# Patient Record
Sex: FEMALE | Race: WHITE | NOT HISPANIC OR LATINO | Employment: UNEMPLOYED | URBAN - METROPOLITAN AREA
[De-identification: names, ages, dates, MRNs, and addresses within clinical notes are randomized per-mention and may not be internally consistent; named-entity substitution may affect disease eponyms.]

---

## 2017-01-31 ENCOUNTER — GENERIC CONVERSION - ENCOUNTER (OUTPATIENT)
Dept: OTHER | Facility: OTHER | Age: 22
End: 2017-01-31

## 2017-04-19 ENCOUNTER — GENERIC CONVERSION - ENCOUNTER (OUTPATIENT)
Dept: OTHER | Facility: OTHER | Age: 22
End: 2017-04-19

## 2017-05-23 ENCOUNTER — GENERIC CONVERSION - ENCOUNTER (OUTPATIENT)
Dept: OTHER | Facility: OTHER | Age: 22
End: 2017-05-23

## 2017-06-06 ENCOUNTER — TRANSCRIBE ORDERS (OUTPATIENT)
Dept: LAB | Facility: HOSPITAL | Age: 22
End: 2017-06-06

## 2017-06-06 ENCOUNTER — APPOINTMENT (OUTPATIENT)
Dept: LAB | Facility: HOSPITAL | Age: 22
End: 2017-06-06
Attending: OBSTETRICS & GYNECOLOGY
Payer: COMMERCIAL

## 2017-06-06 DIAGNOSIS — Z34.90 PRENATAL CARE, UNSPECIFIED TRIMESTER: Primary | ICD-10-CM

## 2017-06-06 DIAGNOSIS — Z34.90 PRENATAL CARE, UNSPECIFIED TRIMESTER: ICD-10-CM

## 2017-06-06 LAB
ABO GROUP BLD: NORMAL
BASOPHILS # BLD AUTO: 0.03 THOUSANDS/ΜL (ref 0–0.1)
BASOPHILS NFR BLD AUTO: 0 % (ref 0–1)
BILIRUB UR QL STRIP: NEGATIVE
BLD GP AB SCN SERPL QL: NEGATIVE
CLARITY UR: CLEAR
COLOR UR: YELLOW
EOSINOPHIL # BLD AUTO: 0.2 THOUSAND/ΜL (ref 0–0.61)
EOSINOPHIL NFR BLD AUTO: 3 % (ref 0–6)
ERYTHROCYTE [DISTWIDTH] IN BLOOD BY AUTOMATED COUNT: 12.9 % (ref 11.6–15.1)
GLUCOSE UR STRIP-MCNC: NEGATIVE MG/DL
HCT VFR BLD AUTO: 39.5 % (ref 34.8–46.1)
HGB BLD-MCNC: 13.3 G/DL (ref 11.5–15.4)
HGB UR QL STRIP.AUTO: NEGATIVE
KETONES UR STRIP-MCNC: NEGATIVE MG/DL
LEUKOCYTE ESTERASE UR QL STRIP: NEGATIVE
LYMPHOCYTES # BLD AUTO: 1.9 THOUSANDS/ΜL (ref 0.6–4.47)
LYMPHOCYTES NFR BLD AUTO: 24 % (ref 14–44)
MCH RBC QN AUTO: 29.8 PG (ref 26.8–34.3)
MCHC RBC AUTO-ENTMCNC: 33.7 G/DL (ref 31.4–37.4)
MCV RBC AUTO: 88 FL (ref 82–98)
MONOCYTES # BLD AUTO: 0.44 THOUSAND/ΜL (ref 0.17–1.22)
MONOCYTES NFR BLD AUTO: 6 % (ref 4–12)
NEUTROPHILS # BLD AUTO: 5.33 THOUSANDS/ΜL (ref 1.85–7.62)
NEUTS SEG NFR BLD AUTO: 67 % (ref 43–75)
NITRITE UR QL STRIP: NEGATIVE
NRBC BLD AUTO-RTO: 0 /100 WBCS
PH UR STRIP.AUTO: 6 [PH] (ref 4.5–8)
PLATELET # BLD AUTO: 207 THOUSANDS/UL (ref 149–390)
PMV BLD AUTO: 8.9 FL (ref 8.9–12.7)
PROT UR STRIP-MCNC: NEGATIVE MG/DL
RBC # BLD AUTO: 4.47 MILLION/UL (ref 3.81–5.12)
RH BLD: POSITIVE
RUBV IGG SERPL IA-ACNC: 4.2 IU/ML
SP GR UR STRIP.AUTO: 1.01 (ref 1–1.03)
SPECIMEN EXPIRATION DATE: NORMAL
UROBILINOGEN UR QL STRIP.AUTO: 0.2 E.U./DL
WBC # BLD AUTO: 7.91 THOUSAND/UL (ref 4.31–10.16)

## 2017-06-06 PROCEDURE — 81220 CFTR GENE COM VARIANTS: CPT

## 2017-06-06 PROCEDURE — 80081 OBSTETRIC PANEL INC HIV TSTG: CPT

## 2017-06-06 PROCEDURE — 36415 COLL VENOUS BLD VENIPUNCTURE: CPT

## 2017-06-06 PROCEDURE — 81003 URINALYSIS AUTO W/O SCOPE: CPT

## 2017-06-06 PROCEDURE — 87086 URINE CULTURE/COLONY COUNT: CPT

## 2017-06-07 LAB
BACTERIA UR CULT: NORMAL
HBV SURFACE AG SER QL: NORMAL
HIV 1+2 AB+HIV1 P24 AG SERPL QL IA: NORMAL
RPR SER QL: NORMAL

## 2017-06-13 LAB
CF COMMENT: NORMAL
CFTR MUT ANL BLD/T: NORMAL

## 2017-06-26 DIAGNOSIS — Z34.90 ENCOUNTER FOR SUPERVISION OF NORMAL PREGNANCY: ICD-10-CM

## 2017-08-01 ENCOUNTER — ALLSCRIPTS OFFICE VISIT (OUTPATIENT)
Dept: OTHER | Facility: OTHER | Age: 22
End: 2017-08-01

## 2017-08-01 ENCOUNTER — GENERIC CONVERSION - ENCOUNTER (OUTPATIENT)
Dept: OTHER | Facility: OTHER | Age: 22
End: 2017-08-01

## 2017-08-17 ENCOUNTER — GENERIC CONVERSION - ENCOUNTER (OUTPATIENT)
Dept: OTHER | Facility: OTHER | Age: 22
End: 2017-08-17

## 2017-08-17 ENCOUNTER — ALLSCRIPTS OFFICE VISIT (OUTPATIENT)
Dept: PERINATAL CARE | Facility: CLINIC | Age: 22
End: 2017-08-17
Payer: COMMERCIAL

## 2017-08-17 PROCEDURE — 76805 OB US >/= 14 WKS SNGL FETUS: CPT | Performed by: OBSTETRICS & GYNECOLOGY

## 2017-08-17 PROCEDURE — 76817 TRANSVAGINAL US OBSTETRIC: CPT | Performed by: OBSTETRICS & GYNECOLOGY

## 2017-09-11 ENCOUNTER — ALLSCRIPTS OFFICE VISIT (OUTPATIENT)
Dept: OTHER | Facility: OTHER | Age: 22
End: 2017-09-11

## 2017-09-11 ENCOUNTER — GENERIC CONVERSION - ENCOUNTER (OUTPATIENT)
Dept: OTHER | Facility: OTHER | Age: 22
End: 2017-09-11

## 2017-10-03 ENCOUNTER — LAB CONVERSION - ENCOUNTER (OUTPATIENT)
Dept: OTHER | Facility: OTHER | Age: 22
End: 2017-10-03

## 2017-10-03 LAB
BASOPHILS # BLD AUTO: 0.4 %
BASOPHILS # BLD AUTO: 38 CELLS/UL (ref 0–200)
DEPRECATED RDW RBC AUTO: 12.1 % (ref 11–15)
EOSINOPHIL # BLD AUTO: 0.9 %
EOSINOPHIL # BLD AUTO: 85 CELLS/UL (ref 15–500)
GLUCOSE 1 HR 50 GM GLUC CHALLENGE-PREG PTS (HISTORICAL): 84 MG/DL
HCT VFR BLD AUTO: 33.8 % (ref 35–45)
HEPATITIS C ANTIBODY (HISTORICAL): NORMAL
HGB BLD-MCNC: 11.4 G/DL (ref 11.7–15.5)
LYMPHOCYTES # BLD AUTO: 1288 CELLS/UL (ref 850–3900)
LYMPHOCYTES # BLD AUTO: 13.7 %
MCH RBC QN AUTO: 30.8 PG (ref 27–33)
MCHC RBC AUTO-ENTMCNC: 33.7 G/DL (ref 32–36)
MCV RBC AUTO: 91.4 FL (ref 80–100)
MONOCYTES # BLD AUTO: 414 CELLS/UL (ref 200–950)
MONOCYTES (HISTORICAL): 4.4 %
NEUTROPHILS # BLD AUTO: 7576 CELLS/UL (ref 1500–7800)
NEUTROPHILS # BLD AUTO: 80.6 %
PLATELET # BLD AUTO: 183 THOUSAND/UL (ref 140–400)
PMV BLD AUTO: 9.3 FL (ref 7.5–12.5)
RBC # BLD AUTO: 3.7 MILLION/UL (ref 3.8–5.1)
SIGNAL TO CUT-OFF (HISTORICAL): 0.01
WBC # BLD AUTO: 9.4 THOUSAND/UL (ref 3.8–10.8)

## 2017-10-16 ENCOUNTER — GENERIC CONVERSION - ENCOUNTER (OUTPATIENT)
Dept: OTHER | Facility: OTHER | Age: 22
End: 2017-10-16

## 2017-10-16 ENCOUNTER — ALLSCRIPTS OFFICE VISIT (OUTPATIENT)
Dept: OTHER | Facility: OTHER | Age: 22
End: 2017-10-16

## 2017-10-30 ENCOUNTER — APPOINTMENT (OUTPATIENT)
Dept: PERINATAL CARE | Facility: CLINIC | Age: 22
End: 2017-10-30
Payer: COMMERCIAL

## 2017-10-30 ENCOUNTER — GENERIC CONVERSION - ENCOUNTER (OUTPATIENT)
Dept: OTHER | Facility: OTHER | Age: 22
End: 2017-10-30

## 2017-10-30 PROCEDURE — 76816 OB US FOLLOW-UP PER FETUS: CPT | Performed by: OBSTETRICS & GYNECOLOGY

## 2017-11-01 ENCOUNTER — GENERIC CONVERSION - ENCOUNTER (OUTPATIENT)
Dept: OBGYN CLINIC | Facility: CLINIC | Age: 22
End: 2017-11-01

## 2017-11-20 ENCOUNTER — ALLSCRIPTS OFFICE VISIT (OUTPATIENT)
Dept: OTHER | Facility: OTHER | Age: 22
End: 2017-11-20

## 2017-11-20 LAB
A/G RATIO (HISTORICAL): 1.1 (CALC) (ref 1–2.5)
ALBUMIN SERPL BCP-MCNC: 3.6 G/DL (ref 3.6–5.1)
ALP SERPL-CCNC: 181 U/L (ref 33–115)
ALT SERPL W P-5'-P-CCNC: 18 U/L (ref 6–29)
AST SERPL W P-5'-P-CCNC: 25 U/L (ref 10–30)
BILE ACIDS TOTAL (HISTORICAL): 9.3 UMOL/L
BILIRUB SERPL-MCNC: 0.9 MG/DL (ref 0.2–1.2)
BUN SERPL-MCNC: 7 MG/DL (ref 7–25)
BUN/CREA RATIO (HISTORICAL): ABNORMAL (CALC) (ref 6–22)
CALCIUM (ADJUSTED FOR ALBUMIN) (HISTORICAL): 10.1 MG/DL (CALC) (ref 8.6–10.2)
CALCIUM SERPL-MCNC: 9.5 MG/DL (ref 8.6–10.2)
CHLORIDE SERPL-SCNC: 102 MMOL/L (ref 98–110)
CO2 SERPL-SCNC: 24 MMOL/L (ref 20–31)
CREAT SERPL-MCNC: 0.74 MG/DL (ref 0.5–1.1)
DEPRECATED RDW RBC AUTO: 12.2 % (ref 11–15)
EGFR AFRICAN AMERICAN (HISTORICAL): 133 ML/MIN/1.73M2
EGFR-AMERICAN CALC (HISTORICAL): 115 ML/MIN/1.73M2
GAMMA GLOBULIN (HISTORICAL): 3.2 G/DL (CALC) (ref 1.9–3.7)
GLUCOSE (HISTORICAL): 68 MG/DL (ref 65–99)
HCT VFR BLD AUTO: 39.1 % (ref 35–45)
HGB BLD-MCNC: 13 G/DL (ref 11.7–15.5)
Lab: 9.3 UMOL/L
Lab: <1 UMOL/L
Lab: <1 UMOL/L
MCH RBC QN AUTO: 30.2 PG (ref 27–33)
MCHC RBC AUTO-ENTMCNC: 33.2 G/DL (ref 32–36)
MCV RBC AUTO: 90.7 FL (ref 80–100)
PLATELET # BLD AUTO: 211 THOUSAND/UL (ref 140–400)
PMV BLD AUTO: 10.6 FL (ref 7.5–12.5)
POTASSIUM SERPL-SCNC: 4 MMOL/L (ref 3.5–5.3)
RBC # BLD AUTO: 4.31 MILLION/UL (ref 3.8–5.1)
SODIUM SERPL-SCNC: 138 MMOL/L (ref 135–146)
T4 FREE SERPL-MCNC: 1.2 NG/DL (ref 0.8–1.8)
TOTAL PROTEIN (HISTORICAL): 6.8 G/DL (ref 6.1–8.1)
TSH SERPL DL<=0.05 MIU/L-ACNC: 2.6 MIU/L
WBC # BLD AUTO: 8.8 THOUSAND/UL (ref 3.8–10.8)

## 2017-11-23 LAB — CULTURE GENITAL-BSB ON (HISTORICAL): NEGATIVE

## 2017-11-28 ENCOUNTER — GENERIC CONVERSION - ENCOUNTER (OUTPATIENT)
Dept: OTHER | Facility: OTHER | Age: 22
End: 2017-11-28

## 2017-11-28 DIAGNOSIS — L29.9 PRURITUS: ICD-10-CM

## 2017-11-28 DIAGNOSIS — Z34.03 ENCOUNTER FOR SUPERVISION OF NORMAL FIRST PREGNANCY IN THIRD TRIMESTER: ICD-10-CM

## 2017-11-28 LAB — EXTERNAL GROUP B STREP ANTIGEN: NEGATIVE

## 2017-11-30 ENCOUNTER — LAB CONVERSION - ENCOUNTER (OUTPATIENT)
Dept: OTHER | Facility: OTHER | Age: 22
End: 2017-11-30

## 2017-11-30 LAB
A/G RATIO (HISTORICAL): 1 (CALC) (ref 1–2.5)
ALBUMIN SERPL BCP-MCNC: 3.1 G/DL (ref 3.6–5.1)
ALP SERPL-CCNC: 176 U/L (ref 33–115)
ALT SERPL W P-5'-P-CCNC: 24 U/L (ref 6–29)
AST SERPL W P-5'-P-CCNC: 24 U/L (ref 10–30)
BACTERIA UR QL AUTO: ABNORMAL /HPF
BILIRUB SERPL-MCNC: 0.6 MG/DL (ref 0.2–1.2)
BILIRUB UR QL STRIP: NEGATIVE
BUN SERPL-MCNC: 11 MG/DL (ref 7–25)
BUN/CREA RATIO (HISTORICAL): ABNORMAL (CALC) (ref 6–22)
CALCIUM SERPL-MCNC: 8.3 MG/DL (ref 8.6–10.2)
CHLORIDE SERPL-SCNC: 102 MMOL/L (ref 98–110)
CO2 SERPL-SCNC: 26 MMOL/L (ref 20–31)
COLOR UR: ABNORMAL
COMMENT (HISTORICAL): CLEAR
CREAT SERPL-MCNC: 0.78 MG/DL (ref 0.5–1.1)
CREATININE, RANDOM URINE (HISTORICAL): 151 MG/DL (ref 20–320)
CULTURE RESULT (HISTORICAL): NORMAL
DEPRECATED RDW RBC AUTO: 12.3 % (ref 11–15)
EGFR AFRICAN AMERICAN (HISTORICAL): 124 ML/MIN/1.73M2
EGFR-AMERICAN CALC (HISTORICAL): 107 ML/MIN/1.73M2
FECAL OCCULT BLOOD DIAGNOSTIC (HISTORICAL): NEGATIVE
GAMMA GLOBULIN (HISTORICAL): 3 G/DL (CALC) (ref 1.9–3.7)
GLUCOSE (HISTORICAL): 74 MG/DL (ref 65–99)
GLUCOSE (HISTORICAL): NEGATIVE
HCT VFR BLD AUTO: 37 % (ref 35–45)
HGB BLD-MCNC: 12.3 G/DL (ref 11.7–15.5)
HYALINE CASTS #/AREA URNS LPF: ABNORMAL /LPF
KETONES UR STRIP-MCNC: NEGATIVE MG/DL
LEUKOCYTE ESTERASE UR QL STRIP: NEGATIVE
MCH RBC QN AUTO: 30.4 PG (ref 27–33)
MCHC RBC AUTO-ENTMCNC: 33.2 G/DL (ref 32–36)
MCV RBC AUTO: 91.4 FL (ref 80–100)
NITRITE UR QL STRIP: NEGATIVE
PH UR STRIP.AUTO: 6 [PH] (ref 5–8)
PLATELET # BLD AUTO: 178 THOUSAND/UL (ref 140–400)
PMV BLD AUTO: 11 FL (ref 7.5–12.5)
POTASSIUM SERPL-SCNC: 3.8 MMOL/L (ref 3.5–5.3)
PROT UR STRIP-MCNC: ABNORMAL MG/DL
PROT UR-MCNC: 33 MG/DL (ref 5–24)
PROT/CREAT UR: 219 MG/G CREAT (ref 21–161)
RBC # BLD AUTO: 4.05 MILLION/UL (ref 3.8–5.1)
RBC (HISTORICAL): ABNORMAL /HPF
SODIUM SERPL-SCNC: 135 MMOL/L (ref 135–146)
SP GR UR STRIP.AUTO: 1.02 (ref 1–1.03)
SQUAMOUS EPITHELIAL CELLS (HISTORICAL): ABNORMAL /HPF
TOTAL PROTEIN (HISTORICAL): 6.1 G/DL (ref 6.1–8.1)
URIC ACID (HISTORICAL): 5.3 MG/DL (ref 2.5–7)
WBC # BLD AUTO: 7.5 THOUSAND/UL (ref 3.8–10.8)
WBC # BLD AUTO: ABNORMAL /HPF

## 2017-12-02 ENCOUNTER — LAB CONVERSION - ENCOUNTER (OUTPATIENT)
Dept: OTHER | Facility: OTHER | Age: 22
End: 2017-12-02

## 2017-12-02 LAB
A/G RATIO (HISTORICAL): 1 (CALC) (ref 1–2.5)
ALBUMIN SERPL BCP-MCNC: 3.1 G/DL (ref 3.6–5.1)
ALP SERPL-CCNC: 176 U/L (ref 33–115)
ALT SERPL W P-5'-P-CCNC: 24 U/L (ref 6–29)
AST SERPL W P-5'-P-CCNC: 24 U/L (ref 10–30)
BACTERIA UR QL AUTO: ABNORMAL /HPF
BILE ACIDS TOTAL (HISTORICAL): 48 UMOL/L (ref 0–19)
BILIRUB SERPL-MCNC: 0.6 MG/DL (ref 0.2–1.2)
BILIRUB UR QL STRIP: NEGATIVE
BUN SERPL-MCNC: 11 MG/DL (ref 7–25)
BUN/CREA RATIO (HISTORICAL): ABNORMAL (CALC) (ref 6–22)
CALCIUM SERPL-MCNC: 8.3 MG/DL (ref 8.6–10.2)
CHLORIDE SERPL-SCNC: 102 MMOL/L (ref 98–110)
CO2 SERPL-SCNC: 26 MMOL/L (ref 20–31)
COLOR UR: ABNORMAL
COMMENT (HISTORICAL): CLEAR
CREAT SERPL-MCNC: 0.78 MG/DL (ref 0.5–1.1)
CREATININE, RANDOM URINE (HISTORICAL): 151 MG/DL (ref 20–320)
CULTURE RESULT (HISTORICAL): NORMAL
DEPRECATED RDW RBC AUTO: 12.3 % (ref 11–15)
EGFR AFRICAN AMERICAN (HISTORICAL): 124 ML/MIN/1.73M2
EGFR-AMERICAN CALC (HISTORICAL): 107 ML/MIN/1.73M2
FECAL OCCULT BLOOD DIAGNOSTIC (HISTORICAL): NEGATIVE
GAMMA GLOBULIN (HISTORICAL): 3 G/DL (CALC) (ref 1.9–3.7)
GLUCOSE (HISTORICAL): 74 MG/DL (ref 65–99)
GLUCOSE (HISTORICAL): NEGATIVE
HCT VFR BLD AUTO: 37 % (ref 35–45)
HGB BLD-MCNC: 12.3 G/DL (ref 11.7–15.5)
HYALINE CASTS #/AREA URNS LPF: ABNORMAL /LPF
KETONES UR STRIP-MCNC: NEGATIVE MG/DL
LEUKOCYTE ESTERASE UR QL STRIP: NEGATIVE
MCH RBC QN AUTO: 30.4 PG (ref 27–33)
MCHC RBC AUTO-ENTMCNC: 33.2 G/DL (ref 32–36)
MCV RBC AUTO: 91.4 FL (ref 80–100)
NITRITE UR QL STRIP: NEGATIVE
PH UR STRIP.AUTO: 6 [PH] (ref 5–8)
PLATELET # BLD AUTO: 178 THOUSAND/UL (ref 140–400)
PMV BLD AUTO: 11 FL (ref 7.5–12.5)
POTASSIUM SERPL-SCNC: 3.8 MMOL/L (ref 3.5–5.3)
PROT UR STRIP-MCNC: ABNORMAL MG/DL
PROT UR-MCNC: 33 MG/DL (ref 5–24)
PROT/CREAT UR: 219 MG/G CREAT (ref 21–161)
RBC # BLD AUTO: 4.05 MILLION/UL (ref 3.8–5.1)
RBC (HISTORICAL): ABNORMAL /HPF
SODIUM SERPL-SCNC: 135 MMOL/L (ref 135–146)
SP GR UR STRIP.AUTO: 1.02 (ref 1–1.03)
SQUAMOUS EPITHELIAL CELLS (HISTORICAL): ABNORMAL /HPF
TOTAL PROTEIN (HISTORICAL): 6.1 G/DL (ref 6.1–8.1)
URIC ACID (HISTORICAL): 5.3 MG/DL (ref 2.5–7)
WBC # BLD AUTO: 7.5 THOUSAND/UL (ref 3.8–10.8)
WBC # BLD AUTO: ABNORMAL /HPF

## 2017-12-04 ENCOUNTER — HOSPITAL ENCOUNTER (INPATIENT)
Facility: HOSPITAL | Age: 22
LOS: 4 days | Discharge: HOME | End: 2017-12-08
Attending: OBSTETRICS & GYNECOLOGY | Admitting: OBSTETRICS & GYNECOLOGY
Payer: COMMERCIAL

## 2017-12-04 ENCOUNTER — GENERIC CONVERSION - ENCOUNTER (OUTPATIENT)
Dept: OTHER | Facility: OTHER | Age: 22
End: 2017-12-04

## 2017-12-04 DIAGNOSIS — O45.93 PLACENTAL ABRUPTION IN THIRD TRIMESTER: ICD-10-CM

## 2017-12-04 DIAGNOSIS — Z3A.36 36 WEEKS GESTATION OF PREGNANCY: Primary | ICD-10-CM

## 2017-12-04 LAB
ABO GROUP BLD: NORMAL
ALBUMIN SERPL BCP-MCNC: 2.4 G/DL (ref 3.5–5)
ALP SERPL-CCNC: 215 U/L (ref 46–116)
ALT SERPL W P-5'-P-CCNC: 24 U/L (ref 12–78)
ANION GAP SERPL CALCULATED.3IONS-SCNC: 9 MMOL/L (ref 4–13)
AST SERPL W P-5'-P-CCNC: 34 U/L (ref 5–45)
BACTERIA UR QL AUTO: ABNORMAL /HPF
BILIRUB SERPL-MCNC: 0.76 MG/DL (ref 0.2–1)
BILIRUB UR QL STRIP: ABNORMAL
BLD GP AB SCN SERPL QL: NEGATIVE
BUN SERPL-MCNC: 17 MG/DL (ref 5–25)
CALCIUM SERPL-MCNC: 8.3 MG/DL (ref 8.3–10.1)
CHLORIDE SERPL-SCNC: 102 MMOL/L (ref 100–108)
CLARITY UR: CLEAR
CO2 SERPL-SCNC: 24 MMOL/L (ref 21–32)
COLOR UR: ABNORMAL
CREAT SERPL-MCNC: 0.75 MG/DL (ref 0.6–1.3)
CREAT UR-MCNC: 142 MG/DL
ERYTHROCYTE [DISTWIDTH] IN BLOOD BY AUTOMATED COUNT: 12.9 % (ref 11.6–15.1)
GFR SERPL CREATININE-BSD FRML MDRD: 113 ML/MIN/1.73SQ M
GLUCOSE SERPL-MCNC: 71 MG/DL (ref 65–140)
GLUCOSE UR STRIP-MCNC: NEGATIVE MG/DL
HCT VFR BLD AUTO: 37.1 % (ref 34.8–46.1)
HGB BLD-MCNC: 12.4 G/DL (ref 11.5–15.4)
HGB UR QL STRIP.AUTO: NEGATIVE
HYALINE CASTS #/AREA URNS LPF: ABNORMAL /LPF
KETONES UR STRIP-MCNC: NEGATIVE MG/DL
LEUKOCYTE ESTERASE UR QL STRIP: ABNORMAL
MCH RBC QN AUTO: 30.2 PG (ref 26.8–34.3)
MCHC RBC AUTO-ENTMCNC: 33.4 G/DL (ref 31.4–37.4)
MCV RBC AUTO: 91 FL (ref 82–98)
NITRITE UR QL STRIP: NEGATIVE
NON-SQ EPI CELLS URNS QL MICRO: ABNORMAL /HPF
PH UR STRIP.AUTO: 6 [PH] (ref 4.5–8)
PLATELET # BLD AUTO: 178 THOUSANDS/UL (ref 149–390)
PMV BLD AUTO: 11.2 FL (ref 8.9–12.7)
POTASSIUM SERPL-SCNC: 4.4 MMOL/L (ref 3.5–5.3)
PROT SERPL-MCNC: 7.1 G/DL (ref 6.4–8.2)
PROT UR STRIP-MCNC: ABNORMAL MG/DL
PROT UR-MCNC: 47 MG/DL
PROT/CREAT UR: 0.33 MG/G{CREAT} (ref 0–0.1)
RBC # BLD AUTO: 4.1 MILLION/UL (ref 3.81–5.12)
RBC #/AREA URNS AUTO: ABNORMAL /HPF
RH BLD: POSITIVE
SODIUM SERPL-SCNC: 135 MMOL/L (ref 136–145)
SP GR UR STRIP.AUTO: 1.02 (ref 1–1.03)
SPECIMEN EXPIRATION DATE: NORMAL
UROBILINOGEN UR QL STRIP.AUTO: 1 E.U./DL
WBC # BLD AUTO: 8.15 THOUSAND/UL (ref 4.31–10.16)
WBC #/AREA URNS AUTO: ABNORMAL /HPF

## 2017-12-04 PROCEDURE — 86900 BLOOD TYPING SEROLOGIC ABO: CPT | Performed by: STUDENT IN AN ORGANIZED HEALTH CARE EDUCATION/TRAINING PROGRAM

## 2017-12-04 PROCEDURE — 80053 COMPREHEN METABOLIC PANEL: CPT | Performed by: STUDENT IN AN ORGANIZED HEALTH CARE EDUCATION/TRAINING PROGRAM

## 2017-12-04 PROCEDURE — 81001 URINALYSIS AUTO W/SCOPE: CPT | Performed by: STUDENT IN AN ORGANIZED HEALTH CARE EDUCATION/TRAINING PROGRAM

## 2017-12-04 PROCEDURE — G0463 HOSPITAL OUTPT CLINIC VISIT: HCPCS

## 2017-12-04 PROCEDURE — 86901 BLOOD TYPING SEROLOGIC RH(D): CPT | Performed by: STUDENT IN AN ORGANIZED HEALTH CARE EDUCATION/TRAINING PROGRAM

## 2017-12-04 PROCEDURE — 99214 OFFICE O/P EST MOD 30 MIN: CPT

## 2017-12-04 PROCEDURE — 86592 SYPHILIS TEST NON-TREP QUAL: CPT | Performed by: STUDENT IN AN ORGANIZED HEALTH CARE EDUCATION/TRAINING PROGRAM

## 2017-12-04 PROCEDURE — 85027 COMPLETE CBC AUTOMATED: CPT | Performed by: STUDENT IN AN ORGANIZED HEALTH CARE EDUCATION/TRAINING PROGRAM

## 2017-12-04 PROCEDURE — 86850 RBC ANTIBODY SCREEN: CPT | Performed by: STUDENT IN AN ORGANIZED HEALTH CARE EDUCATION/TRAINING PROGRAM

## 2017-12-04 PROCEDURE — 82570 ASSAY OF URINE CREATININE: CPT | Performed by: STUDENT IN AN ORGANIZED HEALTH CARE EDUCATION/TRAINING PROGRAM

## 2017-12-04 PROCEDURE — 84156 ASSAY OF PROTEIN URINE: CPT | Performed by: STUDENT IN AN ORGANIZED HEALTH CARE EDUCATION/TRAINING PROGRAM

## 2017-12-04 RX ORDER — OXYTOCIN/RINGER'S LACTATE 30/500 ML
1-30 PLASTIC BAG, INJECTION (ML) INTRAVENOUS
Status: DISCONTINUED | OUTPATIENT
Start: 2017-12-04 | End: 2017-12-05

## 2017-12-04 RX ORDER — BETAMETHASONE SODIUM PHOSPHATE AND BETAMETHASONE ACETATE 3; 3 MG/ML; MG/ML
12 INJECTION, SUSPENSION INTRA-ARTICULAR; INTRALESIONAL; INTRAMUSCULAR; SOFT TISSUE EVERY 24 HOURS
Status: DISCONTINUED | OUTPATIENT
Start: 2017-12-04 | End: 2017-12-05

## 2017-12-04 RX ORDER — SODIUM CHLORIDE, SODIUM LACTATE, POTASSIUM CHLORIDE, CALCIUM CHLORIDE 600; 310; 30; 20 MG/100ML; MG/100ML; MG/100ML; MG/100ML
125 INJECTION, SOLUTION INTRAVENOUS CONTINUOUS
Status: DISCONTINUED | OUTPATIENT
Start: 2017-12-04 | End: 2017-12-05

## 2017-12-04 RX ORDER — ONDANSETRON 2 MG/ML
4 INJECTION INTRAMUSCULAR; INTRAVENOUS EVERY 6 HOURS PRN
Status: DISCONTINUED | OUTPATIENT
Start: 2017-12-04 | End: 2017-12-05

## 2017-12-04 RX ADMIN — BETAMETHASONE SODIUM PHOSPHATE AND BETAMETHASONE ACETATE 12 MG: 3; 3 INJECTION, SUSPENSION INTRA-ARTICULAR; INTRALESIONAL; INTRAMUSCULAR at 15:39

## 2017-12-04 RX ADMIN — Medication 1 MILLI-UNITS/MIN: at 16:11

## 2017-12-04 RX ADMIN — SODIUM CHLORIDE, SODIUM LACTATE, POTASSIUM CHLORIDE, AND CALCIUM CHLORIDE 125 ML/HR: .6; .31; .03; .02 INJECTION, SOLUTION INTRAVENOUS at 22:16

## 2017-12-04 RX ADMIN — SODIUM CHLORIDE, SODIUM LACTATE, POTASSIUM CHLORIDE, AND CALCIUM CHLORIDE 125 ML/HR: .6; .31; .03; .02 INJECTION, SOLUTION INTRAVENOUS at 14:24

## 2017-12-04 NOTE — OB LABOR/OXYTOCIN SAFETY PROGRESS
Oxytocin Safety Progress Check Note - Vinod Carl 20 y o  female MRN: [id number]    Unit/Bed#: -01 Encounter: [id number]    Obstetric History       T0      L0     SAB0   TAB0   Ectopic0   Multiple0   Live Births0      Gestational Age: 37w2d  Dose (holger-units/min) Oxytocin: 2 holger-units/min  Contraction Frequency (minutes): 2-4  Contraction Quality: Mild  Tachysystole: No   Dilation: 3        Effacement (%): 70  Station: -1  Baseline Rate: 130 bpm  Fetal Heart Rate: 140 BPM  FHR Category: Category I     Oxytocin Safety Progress Check: Safety check completed    Notes/comments:   SVE unchanged  Pit to be increased to 4 milliunits   D/w Dr Sami Castellano MD 2017 6:15 PM

## 2017-12-04 NOTE — PLAN OF CARE
ANTEPARTUM     Maintain pregnancy as long as maternal and/or fetal condition is stable Progressing        BIRTH - VAGINAL/ SECTION     Fetal and maternal status remain reassuring during the birth process [de-identified]     Emotionally satisfying birthing experience for mother/fetus [address] Discharge to home or other facility with appropriate resources Progressing        INFECTION - ADULT     Absence or prevention of progression during hospitalization Progressing        Knowledge Deficit     Verbalizes understanding of labor plan Progressing     Patient/family/caregiver demonstrates understanding of disease process, treatment plan, medications, and discharge instructions Progressing        Labor & Delivery     Manages discomfort Progressing     Patient vital signs are stable Progressing        PAIN - ADULT     Verbalizes/displays adequate comfort level or baseline comfort level Progressing        SAFETY ADULT     Patient will remain free of falls Progressing     Maintain or return to baseline ADL function Progressing     Maintain or return mobility status to optimal level Progressing

## 2017-12-04 NOTE — H&P
H&P Exam - Obstetrics   Ashwini Rosado 20 y o  female MRN: [id number]  Unit/Bed#: LD Triage  Encounter: [id number]      History of Present Illness     Chief Complaint: Pre-eclampsia, nonreassuring FHT    HPI:  Ashwini Rosado is a 20 y o   female with an CANDIE of 2017, by Last Menstrual Period at 36w4d weeks gestation who is being admitted for preeclampsia and nonreassuring FHT  She has been having elevated blood pressures for the past two office visits  She had a headache last week that resolved on its own  Currently denies headache, epigastric pain, vision change  Contractions: no  Vaginal Bleeding:no  Loss of Fluid:no  Fetal Movement:yes    PREGNANCY COMPLICATIONS: cholestasis in pregnancy, rubella nonimmune    OB History    Para Term  AB Living   1             SAB TAB Ectopic Multiple Live Births                  # Outcome Date GA Lbr Tim/2nd Weight Sex Delivery Anes PTL Lv   1 Current                   Baby complications/comments: none    Review of Systems   Constitutional: Negative for chills and fever  Eyes: Negative for visual disturbance  Respiratory: Negative for shortness of breath  Cardiovascular: Negative for chest pain  Gastrointestinal: Positive for abdominal pain  Negative for constipation, diarrhea, nausea and vomiting  Genitourinary: Negative for vaginal bleeding, vaginal discharge and vaginal pain  Neurological: Negative for headaches  Historical Information   No past medical history on file  No past surgical history on file    Social History   History   Alcohol use Not on file     History   Drug use: Unknown     History   Smoking Status    Not on file   Smokeless Tobacco    Not on file     Family History: non-contributory    Meds/Allergies    {  Prescriptions Prior to Admission   Medication    Prenatal MV-Min-Fe Fum-FA-DHA (PRENATAL 1 PO)      No Known Allergies    OBJECTIVE:    Vitals:   /94   Pulse 69   Temp 98 °F (36 7 °C) (Oral)   Resp 16   LMP 03/23/2017   There is no height or weight on file to calculate BMI  Physical Exam   Constitutional: She is oriented to person, place, and time  She appears well-developed and well-nourished  Cardiovascular: Normal rate and regular rhythm  Exam reveals no gallop and no friction rub  No murmur heard  Pulmonary/Chest: Effort normal and breath sounds normal    Abdominal: Soft  Bowel sounds are normal  There is no tenderness  gravid   Neurological: She is alert and oriented to person, place, and time       SVE:      FHT:  Baseline Rate: 150 bpm  Variability: Moderate 6-25 bpm  Accelerations: 15 x 15 or greater, At variable times  Decelerations: None  TOCO:   Contraction Frequency (minutes):  (1 prolonged cxt noted, pt denies any pain)  Contraction Duration (seconds): 240  Contraction Quality: Not applicable      Prenatal Labs:   Blood type: O positive  Antigen Screen: negative  Rubella: Immune  HIV: Negative  RPR: NR  Hep B: negative  Diabetes Screen: 84  GBS: negative    Recent Results (from the past 12 hour(s))   CBC and Platelet    Collection Time: 12/04/17 12:28 PM   Result Value Ref Range    WBC 8 15 4 31 - 10 16 Thousand/uL    RBC 4 10 3 81 - 5 12 Million/uL    Hemoglobin 12 4 11 5 - 15 4 g/dL    Hematocrit 37 1 34 8 - 46 1 %    MCV 91 82 - 98 fL    MCH 30 2 26 8 - 34 3 pg    MCHC 33 4 31 4 - 37 4 g/dL    RDW 12 9 11 6 - 15 1 %    Platelets 457 035 - 968 Thousands/uL    MPV 11 2 8 9 - 12 7 fL   Comprehensive metabolic panel    Collection Time: 12/04/17 12:28 PM   Result Value Ref Range    Sodium 135 (L) 136 - 145 mmol/L    Potassium 4 4 3 5 - 5 3 mmol/L    Chloride 102 100 - 108 mmol/L    CO2 24 21 - 32 mmol/L    Anion Gap 9 4 - 13 mmol/L    BUN 17 5 - 25 mg/dL    Creatinine 0 75 0 60 - 1 30 mg/dL    Glucose 71 65 - 140 mg/dL    Calcium 8 3 8 3 - 10 1 mg/dL    AST 34 5 - 45 U/L    ALT 24 12 - 78 U/L    Alkaline Phosphatase 215 (H) 46 - 116 U/L    Total Protein 7 1 6 4 - 8 2 g/dL    Albumin 2 4 (L) 3 5 - 5 0 g/dL    Total Bilirubin 0 76 0 20 - 1 00 mg/dL    eGFR 113 ml/min/1 73sq m   Protein / creatinine ratio, urine    Collection Time: 17 12:28 PM   Result Value Ref Range    Creatinine, Ur 142 0 mg/dL    Protein Urine Random 47 mg/dL    Prot/Creat Ratio, Ur 0 33 (H) 0 00 - 0 10   UA (URINE) with reflex to Microscopic    Collection Time: 17 12:28 PM   Result Value Ref Range    Color, UA Dk Yellow     Clarity, UA Clear     Specific [place], UA 1 019 1 003 - 1 030    pH, UA 6 0 4 5 - 8 0    Leukocytes, UA Small (A) Negative    Nitrite, UA Negative Negative    Protein, UA 30 (1+) (A) Negative mg/dl    Glucose, UA Negative Negative mg/dl    Ketones, UA Negative Negative mg/dl    Urobilinogen, UA 1 0 0 2, 1 0 E U /dl E U /dl    Bilirubin, UA Interference- unable to analyze (A) Negative    Blood, UA Negative Negative   Urine Microscopic    Collection Time: 17 12:28 PM   Result Value Ref Range    RBC, UA None Seen None Seen, 0-5 /hpf    WBC, UA 4-10 (A) None Seen, 0-5, 5-55, 5-65 /hpf    Epithelial Cells Occasional None Seen, Occasional /hpf    Bacteria, UA Occasional None Seen, Occasional /hpf    Hyaline Casts, UA 3-5 (A) None Seen /lpf         Invasive Devices     Peripheral Intravenous Line            Peripheral IV 17 Left Forearm less than 1 day                  Assessment/Plan     ASSESSMENT:   IUP at 36w4d weeks gestation for IOL for preelampsia, cholestasis, and nonreassuring FHT  PLAN:   1) Admit   2) RPR, Blood Type, BTM   3) Analgesia and/or epidural at patient request   4) Anticipate , unless FHT worsens  5) D/W Dr Hood Donate        This patient will be an INPATIENT  and I certify the anticipated length of stay is >2 Midnights        Christian Francis MD  2017  2:32 PM

## 2017-12-05 ENCOUNTER — ANESTHESIA EVENT (INPATIENT)
Dept: LABOR AND DELIVERY | Facility: HOSPITAL | Age: 22
End: 2017-12-05
Payer: COMMERCIAL

## 2017-12-05 ENCOUNTER — ANESTHESIA (INPATIENT)
Dept: LABOR AND DELIVERY | Facility: HOSPITAL | Age: 22
End: 2017-12-05
Payer: COMMERCIAL

## 2017-12-05 PROBLEM — O45.90 PLACENTAL ABRUPTION: Status: ACTIVE | Noted: 2017-12-05

## 2017-12-05 PROBLEM — Z98.891 STATUS POST PRIMARY LOW TRANSVERSE CESAREAN SECTION: Status: ACTIVE | Noted: 2017-12-05

## 2017-12-05 LAB
BASE EXCESS BLDCOA CALC-SCNC: -0.2 MMOL/L (ref 3–11)
BASE EXCESS BLDCOV CALC-SCNC: -0.6 MMOL/L (ref 1–9)
HCO3 BLDCOA-SCNC: 26.1 MMOL/L (ref 17.3–27.3)
HCO3 BLDCOV-SCNC: 24.4 MMOL/L (ref 12.2–28.6)
O2 CT VFR BLDCOA CALC: 5.9 ML/DL
OXYHGB MFR BLDCOA: 32.9 %
OXYHGB MFR BLDCOV: 70 %
PCO2 BLDCOA: 49.6 MM[HG] (ref 30–60)
PCO2 BLDCOV: 41.2 MM HG (ref 27–43)
PH BLDCOA: 7.34 [PH] (ref 7.23–7.43)
PH BLDCOV: 7.39 [PH] (ref 7.19–7.49)
PO2 BLDCOA: 15.7 MM HG (ref 5–25)
PO2 BLDCOV: 28.7 MM HG (ref 15–45)
RPR SER QL: NORMAL
SAO2 % BLDCOV: 12.9 ML/DL

## 2017-12-05 PROCEDURE — 10907ZC DRAINAGE OF AMNIOTIC FLUID, THERAPEUTIC FROM PRODUCTS OF CONCEPTION, VIA NATURAL OR ARTIFICIAL OPENING: ICD-10-PCS | Performed by: OBSTETRICS & GYNECOLOGY

## 2017-12-05 PROCEDURE — 94762 N-INVAS EAR/PLS OXIMTRY CONT: CPT

## 2017-12-05 PROCEDURE — 88307 TISSUE EXAM BY PATHOLOGIST: CPT | Performed by: OBSTETRICS & GYNECOLOGY

## 2017-12-05 PROCEDURE — 82805 BLOOD GASES W/O2 SATURATION: CPT | Performed by: OBSTETRICS & GYNECOLOGY

## 2017-12-05 PROCEDURE — 3E033VJ INTRODUCTION OF OTHER HORMONE INTO PERIPHERAL VEIN, PERCUTANEOUS APPROACH: ICD-10-PCS | Performed by: OBSTETRICS & GYNECOLOGY

## 2017-12-05 RX ORDER — FENTANYL CITRATE 50 UG/ML
INJECTION, SOLUTION INTRAMUSCULAR; INTRAVENOUS
Status: DISPENSED
Start: 2017-12-05 | End: 2017-12-05

## 2017-12-05 RX ORDER — METOCLOPRAMIDE HYDROCHLORIDE 5 MG/ML
INJECTION INTRAMUSCULAR; INTRAVENOUS AS NEEDED
Status: DISCONTINUED | OUTPATIENT
Start: 2017-12-05 | End: 2017-12-05 | Stop reason: SURG

## 2017-12-05 RX ORDER — METOCLOPRAMIDE HYDROCHLORIDE 5 MG/ML
5 INJECTION INTRAMUSCULAR; INTRAVENOUS EVERY 6 HOURS PRN
Status: ACTIVE | OUTPATIENT
Start: 2017-12-05 | End: 2017-12-06

## 2017-12-05 RX ORDER — TRISODIUM CITRATE DIHYDRATE AND CITRIC ACID MONOHYDRATE 500; 334 MG/5ML; MG/5ML
30 SOLUTION ORAL 4 TIMES DAILY PRN
Status: DISCONTINUED | OUTPATIENT
Start: 2017-12-05 | End: 2017-12-08 | Stop reason: HOSPADM

## 2017-12-05 RX ORDER — MAGNESIUM HYDROXIDE/ALUMINUM HYDROXICE/SIMETHICONE 120; 1200; 1200 MG/30ML; MG/30ML; MG/30ML
15 SUSPENSION ORAL EVERY 6 HOURS PRN
Status: DISCONTINUED | OUTPATIENT
Start: 2017-12-05 | End: 2017-12-08 | Stop reason: HOSPADM

## 2017-12-05 RX ORDER — OXYCODONE HYDROCHLORIDE AND ACETAMINOPHEN 5; 325 MG/1; MG/1
1 TABLET ORAL EVERY 4 HOURS PRN
Status: DISCONTINUED | OUTPATIENT
Start: 2017-12-06 | End: 2017-12-08 | Stop reason: HOSPADM

## 2017-12-05 RX ORDER — OXYTOCIN/RINGER'S LACTATE 30/500 ML
62.5 PLASTIC BAG, INJECTION (ML) INTRAVENOUS CONTINUOUS
Status: DISPENSED | OUTPATIENT
Start: 2017-12-05 | End: 2017-12-06

## 2017-12-05 RX ORDER — CALCIUM CARBONATE 200(500)MG
1000 TABLET,CHEWABLE ORAL DAILY PRN
Status: DISCONTINUED | OUTPATIENT
Start: 2017-12-05 | End: 2017-12-08 | Stop reason: HOSPADM

## 2017-12-05 RX ORDER — FENTANYL CITRATE 50 UG/ML
INJECTION, SOLUTION INTRAMUSCULAR; INTRAVENOUS AS NEEDED
Status: DISCONTINUED | OUTPATIENT
Start: 2017-12-05 | End: 2017-12-05 | Stop reason: SURG

## 2017-12-05 RX ORDER — KETOROLAC TROMETHAMINE 30 MG/ML
INJECTION, SOLUTION INTRAMUSCULAR; INTRAVENOUS AS NEEDED
Status: DISCONTINUED | OUTPATIENT
Start: 2017-12-05 | End: 2017-12-05 | Stop reason: SURG

## 2017-12-05 RX ORDER — BISACODYL 10 MG
10 SUPPOSITORY, RECTAL RECTAL DAILY PRN
Status: DISCONTINUED | OUTPATIENT
Start: 2017-12-05 | End: 2017-12-08 | Stop reason: HOSPADM

## 2017-12-05 RX ORDER — ONDANSETRON 2 MG/ML
4 INJECTION INTRAMUSCULAR; INTRAVENOUS EVERY 8 HOURS PRN
Status: DISCONTINUED | OUTPATIENT
Start: 2017-12-05 | End: 2017-12-08 | Stop reason: HOSPADM

## 2017-12-05 RX ORDER — LIDOCAINE HYDROCHLORIDE AND EPINEPHRINE 20; 5 MG/ML; UG/ML
INJECTION, SOLUTION EPIDURAL; INFILTRATION; INTRACAUDAL; PERINEURAL AS NEEDED
Status: DISCONTINUED | OUTPATIENT
Start: 2017-12-05 | End: 2017-12-05 | Stop reason: SURG

## 2017-12-05 RX ORDER — DOCUSATE SODIUM 100 MG/1
100 CAPSULE, LIQUID FILLED ORAL 2 TIMES DAILY PRN
Status: DISCONTINUED | OUTPATIENT
Start: 2017-12-05 | End: 2017-12-08 | Stop reason: HOSPADM

## 2017-12-05 RX ORDER — ONDANSETRON 2 MG/ML
INJECTION INTRAMUSCULAR; INTRAVENOUS AS NEEDED
Status: DISCONTINUED | OUTPATIENT
Start: 2017-12-05 | End: 2017-12-05 | Stop reason: SURG

## 2017-12-05 RX ORDER — SENNOSIDES 8.6 MG
1 TABLET ORAL
Status: DISCONTINUED | OUTPATIENT
Start: 2017-12-05 | End: 2017-12-08 | Stop reason: HOSPADM

## 2017-12-05 RX ORDER — DEXAMETHASONE SODIUM PHOSPHATE 4 MG/ML
INJECTION, SOLUTION INTRA-ARTICULAR; INTRALESIONAL; INTRAMUSCULAR; INTRAVENOUS; SOFT TISSUE AS NEEDED
Status: DISCONTINUED | OUTPATIENT
Start: 2017-12-05 | End: 2017-12-05 | Stop reason: SURG

## 2017-12-05 RX ORDER — ACETAMINOPHEN 325 MG/1
650 TABLET ORAL EVERY 6 HOURS
Status: DISCONTINUED | OUTPATIENT
Start: 2017-12-05 | End: 2017-12-08 | Stop reason: HOSPADM

## 2017-12-05 RX ORDER — ACETAMINOPHEN 325 MG/1
650 TABLET ORAL EVERY 6 HOURS PRN
Status: DISCONTINUED | OUTPATIENT
Start: 2017-12-05 | End: 2017-12-08 | Stop reason: HOSPADM

## 2017-12-05 RX ORDER — OXYTOCIN/RINGER'S LACTATE 30/500 ML
PLASTIC BAG, INJECTION (ML) INTRAVENOUS
Status: DISPENSED
Start: 2017-12-05 | End: 2017-12-05

## 2017-12-05 RX ORDER — SIMETHICONE 80 MG
80 TABLET,CHEWABLE ORAL 4 TIMES DAILY PRN
Status: DISCONTINUED | OUTPATIENT
Start: 2017-12-05 | End: 2017-12-08 | Stop reason: HOSPADM

## 2017-12-05 RX ORDER — BUPIVACAINE HYDROCHLORIDE 2.5 MG/ML
INJECTION, SOLUTION INFILTRATION; PERINEURAL AS NEEDED
Status: DISCONTINUED | OUTPATIENT
Start: 2017-12-05 | End: 2017-12-05 | Stop reason: SURG

## 2017-12-05 RX ORDER — KETOROLAC TROMETHAMINE 30 MG/ML
30 INJECTION, SOLUTION INTRAMUSCULAR; INTRAVENOUS EVERY 6 HOURS
Status: DISCONTINUED | OUTPATIENT
Start: 2017-12-05 | End: 2017-12-07

## 2017-12-05 RX ORDER — OXYCODONE HYDROCHLORIDE AND ACETAMINOPHEN 5; 325 MG/1; MG/1
2 TABLET ORAL EVERY 4 HOURS PRN
Status: DISCONTINUED | OUTPATIENT
Start: 2017-12-06 | End: 2017-12-08 | Stop reason: HOSPADM

## 2017-12-05 RX ORDER — FENTANYL CITRATE/PF 50 MCG/ML
50 SYRINGE (ML) INJECTION
Status: DISCONTINUED | OUTPATIENT
Start: 2017-12-05 | End: 2017-12-05

## 2017-12-05 RX ORDER — PROMETHAZINE HYDROCHLORIDE 25 MG/ML
25 INJECTION, SOLUTION INTRAMUSCULAR; INTRAVENOUS ONCE AS NEEDED
Status: DISCONTINUED | OUTPATIENT
Start: 2017-12-05 | End: 2017-12-05

## 2017-12-05 RX ORDER — MORPHINE SULFATE 0.5 MG/ML
INJECTION, SOLUTION EPIDURAL; INTRATHECAL; INTRAVENOUS AS NEEDED
Status: DISCONTINUED | OUTPATIENT
Start: 2017-12-05 | End: 2017-12-05 | Stop reason: SURG

## 2017-12-05 RX ORDER — DIAPER,BRIEF,INFANT-TODD,DISP
1 EACH MISCELLANEOUS DAILY PRN
Status: DISCONTINUED | OUTPATIENT
Start: 2017-12-05 | End: 2017-12-08 | Stop reason: HOSPADM

## 2017-12-05 RX ORDER — CEFAZOLIN SODIUM 1 G/3ML
INJECTION, POWDER, FOR SOLUTION INTRAMUSCULAR; INTRAVENOUS AS NEEDED
Status: DISCONTINUED | OUTPATIENT
Start: 2017-12-05 | End: 2017-12-05 | Stop reason: SURG

## 2017-12-05 RX ORDER — OXYCODONE HYDROCHLORIDE AND ACETAMINOPHEN 5; 325 MG/1; MG/1
1 TABLET ORAL EVERY 6 HOURS PRN
Status: DISCONTINUED | OUTPATIENT
Start: 2017-12-05 | End: 2017-12-08 | Stop reason: HOSPADM

## 2017-12-05 RX ORDER — ONDANSETRON 2 MG/ML
4 INJECTION INTRAMUSCULAR; INTRAVENOUS EVERY 4 HOURS PRN
Status: ACTIVE | OUTPATIENT
Start: 2017-12-05 | End: 2017-12-06

## 2017-12-05 RX ORDER — DIPHENHYDRAMINE HCL 25 MG
25 TABLET ORAL EVERY 6 HOURS PRN
Status: DISCONTINUED | OUTPATIENT
Start: 2017-12-05 | End: 2017-12-08 | Stop reason: HOSPADM

## 2017-12-05 RX ORDER — IBUPROFEN 600 MG/1
600 TABLET ORAL EVERY 6 HOURS PRN
Status: DISCONTINUED | OUTPATIENT
Start: 2017-12-05 | End: 2017-12-08 | Stop reason: HOSPADM

## 2017-12-05 RX ORDER — SODIUM CHLORIDE, SODIUM LACTATE, POTASSIUM CHLORIDE, CALCIUM CHLORIDE 600; 310; 30; 20 MG/100ML; MG/100ML; MG/100ML; MG/100ML
125 INJECTION, SOLUTION INTRAVENOUS CONTINUOUS
Status: DISCONTINUED | OUTPATIENT
Start: 2017-12-05 | End: 2017-12-08 | Stop reason: HOSPADM

## 2017-12-05 RX ADMIN — SODIUM CHLORIDE, SODIUM LACTATE, POTASSIUM CHLORIDE, AND CALCIUM CHLORIDE: .6; .31; .03; .02 INJECTION, SOLUTION INTRAVENOUS at 07:25

## 2017-12-05 RX ADMIN — LIDOCAINE HYDROCHLORIDE AND EPINEPHRINE 5 ML: 20; 5 INJECTION, SOLUTION EPIDURAL; INFILTRATION; INTRACAUDAL; PERINEURAL at 07:26

## 2017-12-05 RX ADMIN — CEFAZOLIN SODIUM 1000 MG: 1 SOLUTION INTRAVENOUS at 07:35

## 2017-12-05 RX ADMIN — LIDOCAINE HYDROCHLORIDE AND EPINEPHRINE 5 ML: 20; 5 INJECTION, SOLUTION EPIDURAL; INFILTRATION; INTRACAUDAL; PERINEURAL at 07:25

## 2017-12-05 RX ADMIN — MORPHINE SULFATE 3 MG: 0.5 INJECTION, SOLUTION EPIDURAL; INTRATHECAL; INTRAVENOUS at 08:11

## 2017-12-05 RX ADMIN — AZITHROMYCIN 500 MG: 500 INJECTION, POWDER, LYOPHILIZED, FOR SOLUTION INTRAVENOUS at 07:36

## 2017-12-05 RX ADMIN — KETOROLAC TROMETHAMINE 30 MG: 30 INJECTION, SOLUTION INTRAMUSCULAR at 08:11

## 2017-12-05 RX ADMIN — LIDOCAINE HYDROCHLORIDE AND EPINEPHRINE 10 ML: 20; 5 INJECTION, SOLUTION EPIDURAL; INFILTRATION; INTRACAUDAL; PERINEURAL at 07:29

## 2017-12-05 RX ADMIN — BUPIVACAINE HYDROCHLORIDE 1 ML: 2.5 INJECTION, SOLUTION INFILTRATION; PERINEURAL at 05:55

## 2017-12-05 RX ADMIN — LIDOCAINE HYDROCHLORIDE AND EPINEPHRINE 5 ML: 20; 5 INJECTION, SOLUTION EPIDURAL; INFILTRATION; INTRACAUDAL; PERINEURAL at 07:31

## 2017-12-05 RX ADMIN — METOCLOPRAMIDE 10 MG: 5 INJECTION, SOLUTION INTRAMUSCULAR; INTRAVENOUS at 08:17

## 2017-12-05 RX ADMIN — CEFAZOLIN 1000 MG: 1 INJECTION, POWDER, FOR SOLUTION INTRAVENOUS at 07:35

## 2017-12-05 RX ADMIN — ACETAMINOPHEN 650 MG: 325 TABLET, FILM COATED ORAL at 17:31

## 2017-12-05 RX ADMIN — SODIUM CHLORIDE, SODIUM LACTATE, POTASSIUM CHLORIDE, AND CALCIUM CHLORIDE 999 ML/HR: .6; .31; .03; .02 INJECTION, SOLUTION INTRAVENOUS at 05:37

## 2017-12-05 RX ADMIN — Medication 62.5 MILLI-UNITS/MIN: at 09:54

## 2017-12-05 RX ADMIN — SODIUM CHLORIDE, POTASSIUM CHLORIDE, SODIUM LACTATE AND CALCIUM CHLORIDE 125 ML/HR: 600; 310; 30; 20 INJECTION, SOLUTION INTRAVENOUS at 13:50

## 2017-12-05 RX ADMIN — Medication 50 UNITS: at 07:45

## 2017-12-05 RX ADMIN — LIDOCAINE HYDROCHLORIDE AND EPINEPHRINE 5 ML: 20; 5 INJECTION, SOLUTION EPIDURAL; INFILTRATION; INTRACAUDAL; PERINEURAL at 07:36

## 2017-12-05 RX ADMIN — DEXAMETHASONE SODIUM PHOSPHATE 4 MG: 4 INJECTION, SOLUTION INTRAMUSCULAR; INTRAVENOUS at 08:11

## 2017-12-05 RX ADMIN — LIDOCAINE HYDROCHLORIDE AND EPINEPHRINE 5 ML: 20; 5 INJECTION, SOLUTION EPIDURAL; INFILTRATION; INTRACAUDAL; PERINEURAL at 07:34

## 2017-12-05 RX ADMIN — SODIUM CHLORIDE, SODIUM LACTATE, POTASSIUM CHLORIDE, AND CALCIUM CHLORIDE 125 ML/HR: .6; .31; .03; .02 INJECTION, SOLUTION INTRAVENOUS at 07:16

## 2017-12-05 RX ADMIN — KETOROLAC TROMETHAMINE 30 MG: 30 INJECTION, SOLUTION INTRAMUSCULAR at 14:30

## 2017-12-05 RX ADMIN — ACETAMINOPHEN 650 MG: 325 TABLET, FILM COATED ORAL at 11:18

## 2017-12-05 RX ADMIN — KETOROLAC TROMETHAMINE 30 MG: 30 INJECTION, SOLUTION INTRAMUSCULAR at 21:00

## 2017-12-05 RX ADMIN — FENTANYL CITRATE 10 MCG: 50 INJECTION, SOLUTION INTRAMUSCULAR; INTRAVENOUS at 05:55

## 2017-12-05 RX ADMIN — ROPIVACAINE HYDROCHLORIDE: 2 INJECTION, SOLUTION EPIDURAL; INFILTRATION at 06:05

## 2017-12-05 RX ADMIN — ONDANSETRON 4 MG: 2 INJECTION INTRAMUSCULAR; INTRAVENOUS at 08:11

## 2017-12-05 RX ADMIN — Medication 62.5 MILLI-UNITS/MIN: at 17:33

## 2017-12-05 NOTE — OB LABOR/OXYTOCIN SAFETY PROGRESS
Oxytocin Safety Progress Check Note - Cary y o  female MRN: [id number]    Unit/Bed#: -01 Encounter: [id number]    Obstetric History       T0      L0     SAB0   TAB0   Ectopic0   Multiple0   Live Births0      Gestational Age: 44w9d  Dose (holger-units/min) Oxytocin: 9 holger-units/min  Contraction Frequency (minutes): 2  Contraction Quality: Moderate  Tachysystole: No   Dilation: 5        Effacement (%): 70  Station: -1  Baseline Rate: 130 bpm  Fetal Heart Rate: 138 BPM  FHR Category: Category I     Oxytocin Safety Progress Check: Safety check completed    Notes/comments:   Pt was feeling more uncomfortable  Now 5cm  Pit still at 9  Pt not requesting epidural at this time (does not want to be asked, will request when she is ready)   Continue management          Madan Amaral MD 2017 5:08 AM
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
## 2017-12-05 NOTE — PLAN OF CARE
ANTEPARTUM     Maintain pregnancy as long as maternal and/or fetal condition is stable Completed          BIRTH - VAGINAL/ SECTION     Fetal and maternal status remain reassuring during the birth process [de-identified]     Emotionally satisfying birthing experience for mother/fetus [address] Discharge to home or other facility with appropriate resources Progressing        INFECTION - ADULT     Absence or prevention of progression during hospitalization Progressing        Knowledge Deficit     Verbalizes understanding of labor plan Progressing     Patient/family/caregiver demonstrates understanding of disease process, treatment plan, medications, and discharge instructions Progressing        Labor & Delivery     Manages discomfort Progressing     Patient vital signs are stable Progressing        PAIN - ADULT     Verbalizes/displays adequate comfort level or baseline comfort level Progressing        SAFETY ADULT     Patient will remain free of falls Progressing     Maintain or return to baseline ADL function Progressing     Maintain or return mobility status to optimal level Progressing

## 2017-12-05 NOTE — OB LABOR/OXYTOCIN SAFETY PROGRESS
Oxytocin Safety Progress Check Note - Mary Castillo 20 y o  female MRN: [id number]    Unit/Bed#: -01 Encounter: [id number]    Obstetric History       T0      L0     SAB0   TAB0   Ectopic0   Multiple0   Live Births0      Gestational Age: 44w9d  Dose (holger-units/min) Oxytocin: 6 holger-units/min  Contraction Frequency (minutes): 4-5 5  Contraction Quality: Moderate  Tachysystole: No   Dilation: 3-4        Effacement (%): 70  Station: -1  Baseline Rate: 115 bpm  Fetal Heart Rate: 120 BPM  FHR Category: Category I     Oxytocin Safety Progress Check: Safety check completed    Notes/comments:   Minimal cervical change   Continue pitocin titration          Fifi Salazar MD 2017 1:10 AM

## 2017-12-05 NOTE — ANESTHESIA PROCEDURE NOTES
CSE Block    Patient location during procedure: OB  Start time: 12/5/2017 5:46 AM  Reason for block: procedure for pain  Staffing  Anesthesiologist: RADHA MARSHALL  Performed: anesthesiologist   Preanesthetic Checklist  Completed: patient identified, surgical consent, pre-op evaluation, timeout performed, IV checked, risks and benefits discussed and monitors and equipment checked  CSE  Patient position: sitting  Prep: Betadine  Patient monitoring: heart rate, continuous pulse ox and frequent blood pressure checks  Approach: midline  Spinal Needle  Needle type: pencil-tip   Needle gauge: 27 G  Needle length: 10 cm  Epidural Needle  Injection technique: RACHEL air  Needle type: Tuohy   Epidural needle gauge: 17G  Needle insertion depth: 5 cm  Location: lumbar (1-5) (L4/5)  Catheter  Catheter type: side hole  Catheter size: 19G  Catheter at skin depth: 11 cm  AssessmentInjection Assessment:  negative aspiration for heme, no pain on injection, no paresthesia on injection and positive aspiration for clear CSF  Additional Notes  Epidural X 1 attempt  L/P with 27G, Clear CSF aspirated  1 ml  0 25% Bupivicaine with 10 mcgs  Fentanyl injected intrathecal  27G needle out  After no aspiration of CSF or Heme, 3mls  N/S injected via 17G Needle  19 G Epidural catheter inserted w/o incident  Taped  Pt  Supine  Back elevated  L Hip wedge  Good pain relief  Bilat  Leg numbness, L>R   Started infusion of 0 2% Ropivicaine at 11mls /hr

## 2017-12-05 NOTE — LACTATION NOTE
This note was copied from a baby's chart  CONSULT - LACTATION  Baby Boy Bety Powers 0 days male MRN: [id number]    [facility] Room / Bed:  301(N)/ 301(N) Encounter: [id number]    Maternal Information     MOTHER:  Tigist Coronado  Maternal Age: 20 y o    OB History: #: 1, Date: 17, Sex: Male, Weight: 3130 g (6 lb 14 4 oz), GA: 36w5d, Delivery: , Low Transverse, Apgar1: 9, Apgar5: 9, Living: Living, Birth Comments: None   Previouse breast reduction surgery? No    Lactation history:   Has patient previously breast fed: No   How long had patient previously breast fed:     Previous breast feeding complications:     History reviewed  No pertinent surgical history  Birth information:  YOB: 2018   Time of birth: 6:40 AM   Sex: male   Delivery type: , Low Transverse   Birth Weight: 3130 g (6 lb 14 4 oz)   Percent of Weight Change: 0%     Gestational Age: 44w9d   [unfilled]    Assessment     Breast and nipple assessment: normal assessment    Damar Assessment: sleepy    Feeding assessment: feeding well  LATCH:  Latch: Grasps breast, tongue down, lips flanged, rhythmic sucking   Audible Swallowing: A few with stimulation   Type of Nipple: Everted (After stimulation)   Comfort (Breast/Nipple): Soft/non-tender   Hold (Positioning): Full assist, teach one side, mother does other, staff holds   LATCH Score: 8          Feeding recommendations:  breast feed on demand, and as often as mom sees any cues for hunger  Met with mother  Provided mother with Ready, Set, Baby booklet  Discussed Skin to Skin contact an benefits to mom and baby  Talked about the delay of the first bath until baby has adjusted  Spoke about the benefits of rooming in  Feeding on cue and what that means for recognizing infant's hunger  Avoidance of pacifiers for the first month discussed   Talked about exclusive breastfeeding for the first 6 months  Positioning and Latch reviewed as well as showing images of other feeding positions  Discussed the properties of a good latch in any position  Reviewed hand/manual expression  Discussed s/s that baby is getting enough milk and some s/s that breastfeeding dyad may need further help  Gave information on common concerns, what to expect the first few weeks after delivery, preparing for other caregivers, and how partners can help  Resources for support also provided  Information on hand expression given  Discussed benefits of knowing how to manually express breast including stimulating milk supply, softening nipple for latch and evacuating breast in the event of engorgement  Spent time working on different positions that would facilitate better transfer of breastmilk  Encoraged MOB and FOB to call for assistance, questions and concerns  Extension number for inpatient lactation support provided      Timothy Ruiz RN 12/5/2017 1:18 PM

## 2017-12-05 NOTE — OB LABOR/OXYTOCIN SAFETY PROGRESS
Oxytocin Safety Progress Check Note - Nai Oar 20 y o  female MRN: [id number]    Unit/Bed#: -01 Encounter: [id number]    Obstetric History       T0      L0     SAB0   TAB0   Ectopic0   Multiple0   Live Births0      Gestational Age: 37w2d  Dose (holger-units/min) Oxytocin: 0 holger-units/min (per Dr Maryam Velasquez pit break)  Contraction Frequency (minutes): 2-3 5  Contraction Quality: Mild  Tachysystole: No   Dilation: 3        Effacement (%): 70  Station: -1  Baseline Rate: 135 bpm  Fetal Heart Rate: 135 BPM  FHR Category: Category I     Oxytocin Safety Progress Check: Safety check completed    Notes/comments:   Per Dr Alee Hayes, will have pitocin break, eat light dinner, ambulate, shower and then restart pitocin given that SVE was unchanged             Daria Bach MD 2017 7:53 PM

## 2017-12-05 NOTE — OP NOTE
Section Operative Report - OB/GYN   Eddie Rene 20 y o  female MRN: [id number]  Unit/Bed#: LD PACU- Encounter: [id number]    Indications: abruptio placenta and non-reassuring fetal status    Pre-operative Diagnosis:   1  36 week 5 day pregnancy  2  Cholestasis of pregnancy  3  Preeclampsia without severe features  4  Non-reassuring fetal heart tones  5  Placental abruption    Post-operative Diagnosis: same, delivered    Surgeon: Love Benitez MD    Assistant(s): Kylie Alaniz MD    Findings:  1  Delivery of viable male on 17 at 0745, weight 6lbs 14oz; Apgar scores of 9 at one minute and 9 at five minutes  2  Normal appearing placenta with centrally-inserted 3 vessel cord  3  Bloody amniotic fluid  4  Grossly normal uterus, tubes, and ovaries    Specimens:   1  Arterial and venous cord gases  2  Cord blood  3  Segment of umbilical cord  4  Placenta to pathology, old and new clots caking portion of placenta     Estimated Blood Loss:  1000 mL           Total IV Fluids: 2000mL    Drains: Childs catheter           Complications:  None; patient tolerated the procedure well  Disposition: PACU            Condition: stable    Procedure Details   Decision was made to proceed with  section due to NRFHT and suspected placental abruption  Patient was made aware of these findings and the proposed plan  Risks, benefits, possible complications, alternate treatment options, and expected outcomes were discussed with the patient  The patient agreed with the proposed plan and gave informed consent  The patient was taken to the operating room where she was properly identified to the OR staff and attending physician  She had already received epidural anesthesia during her labor course, which was augmented appropriately by Dr   preoperatively  Fetal heart tones were appreciated and found to be appropriate  A Childs catheter was aseptically inserted and SCDs were placed   The Vagina and perineum were prepped with betadine  The abdomen was prepped with Chloraprep and following appropriate drying time, the patient was draped in the usual sterile manner for a Pfannenstiel incision  The patient had received Ancef 2g and Azithromycin 500mg IV pre-operatively for prophylaxis  A Time Out was held and the above information confirmed  The patient was identified as Crissie Oar and the procedure verified as  Delivery  A Pfannenstiel incision was made and carried down through the underlying subcutaneous tissue to the fascia using a scalpel  Rectus fascia was then incised in the midline and extended laterally using Damon scissors  The superior edge of the fascia was grasped with Kocher clamps, tented upward, and the underlying muscle was bluntly dissected off  The inferior edge was grasped with Kocher clamps and cleared in similar fashion  All anatomic layers were well-demarcated  The rectus muscles were  and the peritoneum was identified and subsequently entered and extended longitudinally with blunt dissection  The vesicouterine peritoneum was identified and a bladder flap was created using Metzenbaum scissors  The bladder blade was inserted  A low transverse uterine incision was made with the scalpel and extended laterally with blunt dissection  The amnion was entered bluntly  Surgeons hand was inserted through the hysterotomy and the fetal head was palpated, elevated, and delivered through the uterine incision with the assistance of fundal pressure  Baby had spontaneous cry with good color and tone  The umbilical cord was clamped and cut  The infant was handed off to the  providers  Arterial and venous cord gases, cord blood, and a segment of umbilical cord were obtained for evaluation and promptly sent to the lab    The placenta delivered spontaneously with uterine fundal massage and was noted to have a centrally inserted 3 vessel cord with old and new clots caking portion of the placenta  This was also sent to the lab for placental pathology  The uterus was exteriorized and a moist lap sponge was used to clear the cavity of clots and products of conception  The uterine incision was closed with a running locked suture of 0 monpcryl  A second layer of the same suture was used to imbricate the first  @ figure of eight sutures were thrown at right portion of the hysterotomy  Good hemostasis was confirmed upon uterine closure  Warmed normal saline solution was used to irrigate the posterior culdesac and the uterus was returned to the abdomen  The paracolic gutters were inspected and cleared of all clots and debris with irrigation and moist lap sponges  Surgicel was placed along the hysterotomy closure site  The fascia was closed with a running suture of 0 Vicryl  The skin was closed with surgical steel staples  Sterile dressing was applied and an abdominal binder was then placed  At the conclusion of the procedure, all needle, sponge, and instrument counts were noted to be correct x2  The patient tolerated the procedure well and was transferred to her the recovery room in stable condition  Dr Karol Castillo was present and participated in all key portions of the case        Jamila Reyes MD  OB/GYN PGY-2  12/5/2017 9:01 AM

## 2017-12-05 NOTE — OB LABOR/OXYTOCIN SAFETY PROGRESS
Oxytocin Safety Progress Check Note - Chidi Ventura 20 y o  female MRN: [id number]    Unit/Bed#: -01 Encounter: [id number]    Obstetric History       T0      L0     SAB0   TAB0   Ectopic0   Multiple0   Live Births0      Gestational Age: 44w9d  Dose (holger-units/min) Oxytocin: 9 holger-units/min  Contraction Frequency (minutes): 3-5  Contraction Quality: Moderate  Tachysystole: No   Dilation: 4        Effacement (%): 70  Station: -1  Baseline Rate: 120 bpm  Fetal Heart Rate: 130 BPM  FHR Category: Category I     Oxytocin Safety Progress Check: Safety check completed    Notes/comments:   Pt was feeling pelvic pressure  Now 4cm  Continue pitocin titration   Membranes still intact          Alex Norwood MD 2017 3:12 AM

## 2017-12-05 NOTE — ADDENDUM NOTE
Addendum  created 12/05/17 1010 by Sunshine Xiong MD    Anesthesia Intra LDAs edited, LDA properties accepted

## 2017-12-05 NOTE — DISCHARGE SUMMARY
CS Discharge Summary - Nuria Anglin 20 y o  female MRN: [id number]    Unit/Bed#: LD PACU-01 Encounter: [id number]    Admission Date: 2017     Discharge Date: 2017    Admitting Diagnosis: 39 week gestation, cholestasis of pregnancy, preeclampsia without severe features, NRFHT    Discharge Diagnosis: same    Procedures: primary  section, low transverse incision    Attending: Jatinder Adam MD    Hospital Course:     Nuria Anglin is a 20 y o   who was admitted at 39 wks for preeclampsia without severe features, cholestasis of pregnancy, and NRFHT She was induced with pitocin and reached 7cm dilated when gross bright red bleeding was noted per vagina and category II variable decels were noted, patient was taken back for urgent CS    She underwent an uncomplicated  section   was transferred to  nursery  Patient tolerated the procedure well and was transferred to recovery in stable condition  Her post-operative course was uncomplicated  Preoperative hemaglobin was 12 4, postoperative was 10 4  Her postoperative pain was well controlled with oral analgesics  On day of discharge, she was ambulating and able to reasonably perform all ADLs  She was voiding and had appropriate bowel function  Pain was well controlled  She was discharged home on post-operative day #3 without complications  Patient was instructed to follow up with her OB as an outpatient and was given appropriate warnings to call provider if she develops signs of infection or uncontrolled pain  Complications: None    Condition at discharge: good     Discharge instructions/Information to patient and family:   See after visit summary for information provided to patient and family  Provisions for Follow-Up Care:  See after visit summary for information related to follow-up care and any pertinent home health orders  Disposition: Home    Planned Readmission: No    Discharge Medications:    For a complete list of the patient's medications, please refer to her med rec      Daniel Pollard  12/8/2017  6:33 AM

## 2017-12-05 NOTE — PLAN OF CARE
BIRTH - VAGINAL/ SECTION     Fetal and maternal status remain reassuring during the birth process Completed     Emotionally satisfying birthing experience for mother/fetus Completed        Knowledge Deficit     Verbalizes understanding of labor plan Completed        Labor & Delivery     Manages discomfort Completed     Patient vital signs are stable Completed

## 2017-12-05 NOTE — OB LABOR/OXYTOCIN SAFETY PROGRESS
Oxytocin Safety Progress Check Note - Debi King 20 y o  female MRN: [id number]    Unit/Bed#: -01 Encounter: [id number]    Obstetric History       T0      L0     SAB0   TAB0   Ectopic0   Multiple0   Live Births0      Gestational Age: 44w9d  Dose (holger-units/min) Oxytocin: 9 holger-units/min  Contraction Frequency (minutes): 2  Contraction Quality: Moderate  Tachysystole: No   Dilation: 6        Effacement (%): 90  Station: 0  Baseline Rate: 135 bpm  Fetal Heart Rate: 135 BPM  FHR Category: Category I     Oxytocin Safety Progress Check: Safety check completed    Notes/comments:         comfortable with epidural  AROM  For small amount of clear fluid  Some late decelerations immediately after epidural placement that resolved with IV fluid and position changes and now with reassurring FHTs - continue to observe    Marco Stout MD 2017 6:58 AM

## 2017-12-06 LAB
ALBUMIN SERPL BCP-MCNC: 2 G/DL (ref 3.5–5)
ALP SERPL-CCNC: 170 U/L (ref 46–116)
ALT SERPL W P-5'-P-CCNC: 24 U/L (ref 12–78)
ANION GAP SERPL CALCULATED.3IONS-SCNC: 6 MMOL/L (ref 4–13)
AST SERPL W P-5'-P-CCNC: 40 U/L (ref 5–45)
BASOPHILS # BLD AUTO: 0.02 THOUSANDS/ΜL (ref 0–0.1)
BASOPHILS NFR BLD AUTO: 0 % (ref 0–1)
BILIRUB SERPL-MCNC: 0.55 MG/DL (ref 0.2–1)
BUN SERPL-MCNC: 10 MG/DL (ref 5–25)
CALCIUM SERPL-MCNC: 8.1 MG/DL (ref 8.3–10.1)
CHLORIDE SERPL-SCNC: 106 MMOL/L (ref 100–108)
CO2 SERPL-SCNC: 26 MMOL/L (ref 21–32)
CREAT SERPL-MCNC: 0.7 MG/DL (ref 0.6–1.3)
EOSINOPHIL # BLD AUTO: 0.02 THOUSAND/ΜL (ref 0–0.61)
EOSINOPHIL NFR BLD AUTO: 0 % (ref 0–6)
ERYTHROCYTE [DISTWIDTH] IN BLOOD BY AUTOMATED COUNT: 13.4 % (ref 11.6–15.1)
GFR SERPL CREATININE-BSD FRML MDRD: 123 ML/MIN/1.73SQ M
GLUCOSE SERPL-MCNC: 66 MG/DL (ref 65–140)
HCT VFR BLD AUTO: 31.2 % (ref 34.8–46.1)
HGB BLD-MCNC: 10.4 G/DL (ref 11.5–15.4)
LYMPHOCYTES # BLD AUTO: 2.04 THOUSANDS/ΜL (ref 0.6–4.47)
LYMPHOCYTES NFR BLD AUTO: 19 % (ref 14–44)
MCH RBC QN AUTO: 30.8 PG (ref 26.8–34.3)
MCHC RBC AUTO-ENTMCNC: 33.3 G/DL (ref 31.4–37.4)
MCV RBC AUTO: 92 FL (ref 82–98)
MONOCYTES # BLD AUTO: 0.6 THOUSAND/ΜL (ref 0.17–1.22)
MONOCYTES NFR BLD AUTO: 6 % (ref 4–12)
NEUTROPHILS # BLD AUTO: 7.98 THOUSANDS/ΜL (ref 1.85–7.62)
NEUTS SEG NFR BLD AUTO: 75 % (ref 43–75)
NRBC BLD AUTO-RTO: 0 /100 WBCS
PLATELET # BLD AUTO: 163 THOUSANDS/UL (ref 149–390)
PMV BLD AUTO: 10.9 FL (ref 8.9–12.7)
POTASSIUM SERPL-SCNC: 3.9 MMOL/L (ref 3.5–5.3)
PROT SERPL-MCNC: 5.6 G/DL (ref 6.4–8.2)
RBC # BLD AUTO: 3.38 MILLION/UL (ref 3.81–5.12)
SODIUM SERPL-SCNC: 138 MMOL/L (ref 136–145)
WBC # BLD AUTO: 10.72 THOUSAND/UL (ref 4.31–10.16)

## 2017-12-06 PROCEDURE — 85025 COMPLETE CBC W/AUTO DIFF WBC: CPT | Performed by: OBSTETRICS & GYNECOLOGY

## 2017-12-06 PROCEDURE — 80053 COMPREHEN METABOLIC PANEL: CPT | Performed by: OBSTETRICS & GYNECOLOGY

## 2017-12-06 RX ADMIN — Medication 1 TABLET: at 08:29

## 2017-12-06 RX ADMIN — DOCUSATE SODIUM 100 MG: 100 CAPSULE, LIQUID FILLED ORAL at 16:57

## 2017-12-06 RX ADMIN — ENOXAPARIN SODIUM 40 MG: 40 INJECTION SUBCUTANEOUS at 08:29

## 2017-12-06 RX ADMIN — KETOROLAC TROMETHAMINE 30 MG: 30 INJECTION, SOLUTION INTRAMUSCULAR at 14:54

## 2017-12-06 RX ADMIN — KETOROLAC TROMETHAMINE 30 MG: 30 INJECTION, SOLUTION INTRAMUSCULAR at 08:29

## 2017-12-06 RX ADMIN — ACETAMINOPHEN 650 MG: 325 TABLET, FILM COATED ORAL at 16:57

## 2017-12-06 RX ADMIN — Medication 62.5 MILLI-UNITS/MIN: at 01:54

## 2017-12-06 RX ADMIN — DOCUSATE SODIUM 100 MG: 100 CAPSULE, LIQUID FILLED ORAL at 08:29

## 2017-12-06 RX ADMIN — ACETAMINOPHEN 650 MG: 325 TABLET, FILM COATED ORAL at 22:38

## 2017-12-06 RX ADMIN — KETOROLAC TROMETHAMINE 30 MG: 30 INJECTION, SOLUTION INTRAMUSCULAR at 21:11

## 2017-12-06 NOTE — PROGRESS NOTES
Progress Note - OB/GYN   Corrina Yi 20 y o  female MRN: [id number]  Unit/Bed#: -01 Encounter: [id number]    Assessment:   Post Op Day #1 s/p LTCS, stable    Plan:  1  Continue routine post partum care   -Encourage ambulation   -Encourage breastfeeding  2  Abruption: PPH: EBL: 1000cc (300 intrapartum, 700 intraop)  VSS  3  PreE w/o SF   -Bps: 120-140s/70-80s, asymptomatic  4  Cholestasis  5  Lovenox  6  Hb: 12 4 -> pending  7  Childs out at 0500  Voiding trial pending        Subjective/Objective   Chief Complaint:     Post delivery    Subjective:     Pain: yes, cramping, improved with meds  Tolerating PO: yes  Voiding: yes  Flatus: yes  BM: no  Ambulating: yes  Breastfeeding:  Yes  Chest pain: no  Shortness of breath: no  Leg pain: no  Lochia: minimal    Objective:     Vitals: /76   Pulse 72   Temp 98 4 °F (36 9 °C) (Oral)   Resp 18   Ht 5' 6" (1 676 m)   Wt 71 2 kg (157 lb)   LMP 03/23/2017   SpO2 98%   Breastfeeding?  Yes   BMI 25 34 kg/m²       Intake/Output Summary (Last 24 hours) at 12/06/17 0637  Last data filed at 12/06/17 0516   Gross per 24 hour   Intake             1200 ml   Output             3675 ml   Net            -2475 ml       Lab Results   Component Value Date    WBC 8 15 12/04/2017    HGB 12 4 12/04/2017    HCT 37 1 12/04/2017    MCV 91 12/04/2017     12/04/2017       Current Facility-Administered Medications   Medication Dose Route Frequency    acetaminophen (TYLENOL) tablet 650 mg  650 mg Oral Q6H    acetaminophen (TYLENOL) tablet 650 mg  650 mg Oral Q6H PRN    aluminum-magnesium hydroxide-simethicone (MYLANTA) 200-200-20 mg/5 mL oral suspension 15 mL  15 mL Oral Q6H PRN    benzocaine-menthol-lanolin-aloe (DERMOPLAST) 20-0 5 % topical spray 1 application  1 application Topical R7B PRN    bisacodyl (DULCOLAX) rectal suppository 10 mg  10 mg Rectal Daily PRN    calcium carbonate (TUMS) chewable tablet 1,000 mg  1,000 mg Oral Daily PRN    diphenhydrAMINE (BENADRYL) tablet 25 mg  25 mg Oral Q6H PRN    docusate sodium (COLACE) capsule 100 mg  100 mg Oral BID PRN    enoxaparin (LOVENOX) subcutaneous injection 40 mg  40 mg Subcutaneous Q24H [address]    hydrocortisone 1 % cream 1 application  1 application Topical Daily PRN    ibuprofen (MOTRIN) tablet 600 mg  600 mg Oral Q6H PRN    ketorolac (TORADOL) 30 mg/mL injection 30 mg  30 mg Intravenous Q6H    lactated ringers infusion  125 mL/hr Intravenous Continuous    metoclopramide (REGLAN) injection 5 mg  5 mg Intravenous Q6H PRN    ondansetron (ZOFRAN) injection 4 mg  4 mg Intravenous Q4H PRN    ondansetron (ZOFRAN) injection 4 mg  4 mg Intravenous Q8H PRN    oxyCODONE-acetaminophen (PERCOCET) 5-325 mg per tablet 1 tablet  1 tablet Oral Q6H PRN    oxyCODONE-acetaminophen (PERCOCET) 5-325 mg per tablet 1 tablet  1 tablet Oral Q4H PRN    oxyCODONE-acetaminophen (PERCOCET) 5-325 mg per tablet 2 tablet  2 tablet Oral Q4H PRN    oxytocin (PITOCIN) 30 Units in lactated ringers 500 mL infusion  62 5 holger-units/min Intravenous Continuous    prenatal multivitamin tablet 1 tablet  1 tablet Oral Daily    senna (SENOKOT) tablet 8 6 mg  1 tablet Oral HS PRN    simethicone (MYLICON) chewable tablet 80 mg  80 mg Oral 4x Daily PRN    sod citrate-citric acid (BICITRA) oral solution 30 mL  30 mL Oral 4x Daily PRN    witch hazel-glycerin (TUCKS) topical pad 1 pad  1 pad Topical Q4H PRN       Physical Exam:     Gen: AAOx3, NAD  CV: RRR  Lungs: CTA b/l  Abd: Soft, non-tender, non-distended, no rebound or guarding  Uterine fundus firm and non-tender, 1 below umbilicus, incisions are c/d/i  Ext: Non tender    Debra Nieves, PGY-1  OB/GYN  12/6/2017  6:37 AM      Patient seen and examined case and note reviewed agree  Continue pp care, hgb 10 4, labs reviewed

## 2017-12-06 NOTE — PLAN OF CARE
ALTERATION IN THE BREAST     Optimize infant feeding at the breast Progressing        DISCHARGE PLANNING     Discharge to home or other facility with appropriate resources Progressing        INADEQUATE LATCH, SUCK OR SWALLOW     Demonstrate ability to latch and sustain latch, audible swallowing and satiety Progressing        INFECTION - ADULT     Absence or prevention of progression during hospitalization Progressing        Knowledge Deficit     Patient/family/caregiver demonstrates understanding of disease process, treatment plan, medications, and discharge instructions Progressing        PAIN - ADULT     Verbalizes/displays adequate comfort level or baseline comfort level Progressing        POSTPARTUM     Experiences normal postpartum course Progressing     Appropriate maternal -  bonding Progressing     Establishment of infant feeding pattern Progressing     Incision(s), wounds(s) or drain site(s) healing without S/S of infection Progressing        SAFETY ADULT     Patient will remain free of falls Progressing     Maintain or return to baseline ADL function Progressing     Maintain or return mobility status to optimal level Progressing

## 2017-12-07 RX ADMIN — OXYCODONE HYDROCHLORIDE AND ACETAMINOPHEN 1 TABLET: 5; 325 TABLET ORAL at 18:20

## 2017-12-07 RX ADMIN — OXYCODONE HYDROCHLORIDE AND ACETAMINOPHEN 1 TABLET: 5; 325 TABLET ORAL at 22:43

## 2017-12-07 RX ADMIN — KETOROLAC TROMETHAMINE 30 MG: 30 INJECTION, SOLUTION INTRAMUSCULAR at 04:07

## 2017-12-07 RX ADMIN — ENOXAPARIN SODIUM 40 MG: 40 INJECTION SUBCUTANEOUS at 09:02

## 2017-12-07 RX ADMIN — Medication 1 TABLET: at 09:02

## 2017-12-07 RX ADMIN — ACETAMINOPHEN 650 MG: 325 TABLET, FILM COATED ORAL at 22:42

## 2017-12-07 RX ADMIN — ACETAMINOPHEN 650 MG: 325 TABLET, FILM COATED ORAL at 11:54

## 2017-12-07 NOTE — PROGRESS NOTES
Progress Note - OB/GYN   Jamison Shannon 20 y o  female MRN: [id number]  Unit/Bed#: -01 Encounter: [id number]    Assessment:   Post Op Day #2 s/p LTCS, stable    Plan:  1  Continue routine post partum care   -Encourage ambulation   -Encourage breastfeeding  2  Abruption: PPH: EBL: 1000cc (300 intrapartum, 700 intraop)  VSS  3  PreE w/o SF   -Bps: 120-140s/60-80s, asymptomatic  4  Cholestasis  5  Lovenox  6  Hb: 12 4 -> 10 4    Subjective/Objective   Chief Complaint:     Post delivery, no complaints    Subjective:     Pain: yes, cramping, improved with meds  Tolerating PO: yes  Voiding: yes  Flatus: yes  BM: no  Ambulating: yes  Breastfeeding:  Yes  Chest pain: no  Shortness of breath: no  Leg pain: no  Lochia: minimal    Objective:     Vitals: /88 Comment: Nurse Lucita notified   Pulse 89   Temp 98 2 °F (36 8 °C) (Oral)   Resp 18   Ht 5' 6" (1 676 m)   Wt 71 2 kg (157 lb)   LMP 03/23/2017   SpO2 99%   Breastfeeding?  Yes   BMI 25 34 kg/m²       Intake/Output Summary (Last 24 hours) at 12/07/17 0626  Last data filed at 12/06/17 1400   Gross per 24 hour   Intake            562 5 ml   Output             1500 ml   Net           -937 5 ml       Lab Results   Component Value Date    WBC 10 72 (H) 12/06/2017    HGB 10 4 (L) 12/06/2017    HCT 31 2 (L) 12/06/2017    MCV 92 12/06/2017     12/06/2017       Current Facility-Administered Medications   Medication Dose Route Frequency    acetaminophen (TYLENOL) tablet 650 mg  650 mg Oral Q6H    acetaminophen (TYLENOL) tablet 650 mg  650 mg Oral Q6H PRN    aluminum-magnesium hydroxide-simethicone (MYLANTA) 200-200-20 mg/5 mL oral suspension 15 mL  15 mL Oral Q6H PRN    benzocaine-menthol-lanolin-aloe (DERMOPLAST) 20-0 5 % topical spray 1 application  1 application Topical L3R PRN    bisacodyl (DULCOLAX) rectal suppository 10 mg  10 mg Rectal Daily PRN    calcium carbonate (TUMS) chewable tablet 1,000 mg  1,000 mg Oral Daily PRN    diphenhydrAMINE (BENADRYL) tablet 25 mg  25 mg Oral Q6H PRN    docusate sodium (COLACE) capsule 100 mg  100 mg Oral BID PRN    enoxaparin (LOVENOX) subcutaneous injection 40 mg  40 mg Subcutaneous Q24H [address]    hydrocortisone 1 % cream 1 application  1 application Topical Daily PRN    ibuprofen (MOTRIN) tablet 600 mg  600 mg Oral Q6H PRN    ketorolac (TORADOL) 30 mg/mL injection 30 mg  30 mg Intravenous Q6H    lactated ringers infusion  125 mL/hr Intravenous Continuous    measles-mumps-rubella (M-M-R II) subcutaneous injection 0 5 mL  0 5 mL Subcutaneous Once    ondansetron (ZOFRAN) injection 4 mg  4 mg Intravenous Q8H PRN    oxyCODONE-acetaminophen (PERCOCET) 5-325 mg per tablet 1 tablet  1 tablet Oral Q6H PRN    oxyCODONE-acetaminophen (PERCOCET) 5-325 mg per tablet 1 tablet  1 tablet Oral Q4H PRN    oxyCODONE-acetaminophen (PERCOCET) 5-325 mg per tablet 2 tablet  2 tablet Oral Q4H PRN    prenatal multivitamin tablet 1 tablet  1 tablet Oral Daily    senna (SENOKOT) tablet 8 6 mg  1 tablet Oral HS PRN    simethicone (MYLICON) chewable tablet 80 mg  80 mg Oral 4x Daily PRN    sod citrate-citric acid (BICITRA) oral solution 30 mL  30 mL Oral 4x Daily PRN    tetanus-diphtheria-acellular pertussis (BOOSTRIX) IM injection 0 5 mL  0 5 mL Intramuscular Once    witch hazel-glycerin (TUCKS) topical pad 1 pad  1 pad Topical Q4H PRN       Physical Exam:     Gen: AAOx3, NAD  CV: RRR  Lungs: CTA b/l  Abd: Soft, non-tender, non-distended, no rebound or guarding  Uterine fundus firm and non-tender, 1 below umbilicus, incisions are c/d/i  Ext: Non tender    Luna Bradshaw, PGY-1  OB/GYN  12/7/2017  6:26 AM

## 2017-12-07 NOTE — CASE MANAGEMENT
Notification of Maternity Inpatient Admission/Maternity Inpatient Authorization Request  This is a Notification of Maternity Inpatient Admission/Maternity Inpatient Authorization Request to our facility [facility]  Please be advised that this patient is currently in our facility under Inpatient Status  Below you will find the Birth/ Summary, Attending Physician and Facilitys information including NPI# and contact for the Utilization  assigned to the [facility] where the patient is receiving services  Please feel free to contact the Utilization Review Department with any questions  Mothers Information:  Jamison Shannon  MRN: [id number]  YOB: 1995  Admission Date: 2017 11:25 AM  Discharge Date: No discharge date for patient encounter  Disposition: Final discharge disposition not confirmed  Admitting Diagnosis:   O82  DELIVERY  Hypertension in pregnancy, preeclampsia [O14 90]  36 weeks gestation of pregnancy [Z3A 36]  [place] Information:  Estimated Date of Delivery: 17  Information for the patient's :  Amandeep Whitt [[id number]]      Delivery Information:  Sex: male  Delivered 2017 7:45 AM by , Low Transverse; Gestational Age: 44w9d     Measurements:  Weight: 6 lb 14 4 oz (3130 g); Height: 19"    APGAR 1 minute 5 minutes 10 minutes   Totals: 9 9      OB History      Para Term  AB Living    1 1   1   1    SAB TAB Ectopic Multiple Live Births          0 1        Attending Physician:  KELSEA Perales    Specialty- Obstetrics and Gynecology  [facility] ID- [id number]  [address] [place] [address], [address] [place] [address]  Phone 1: [phone]  Fax: [phone] [facility])  [address] [place] [address]  [phone]  Tax ID: [id number]  NPI: [id number]    [address] [facility] in the [facility] by Imtiaz Arnold for 2017  Network Utilization Review Department  Phone: [phone]; Fax [phone]  ATTENTION: The Network Utilization Review Department is now centralized for our 7 Facilities  Make a note that we have a new phone and fax numbers for our Department  Please call with any questions or concerns to [phone] and carefully follow the prompts so that you are directed to the right person  All voicemails are confidential  Fax any determinations, approvals, denials, and requests for initial or continue stay review clinical to [phone]  Due to HIGH CALL volume, it would be easier if you could please send faxed requests to expedite your requests and in part, help us provide discharge notifications faster

## 2017-12-08 VITALS
OXYGEN SATURATION: 97 % | RESPIRATION RATE: 18 BRPM | DIASTOLIC BLOOD PRESSURE: 83 MMHG | HEART RATE: 59 BPM | TEMPERATURE: 98.2 F | SYSTOLIC BLOOD PRESSURE: 148 MMHG | BODY MASS INDEX: 25.23 KG/M2 | WEIGHT: 157 LBS | HEIGHT: 66 IN

## 2017-12-08 PROCEDURE — 90707 MMR VACCINE SC: CPT | Performed by: OBSTETRICS & GYNECOLOGY

## 2017-12-08 PROCEDURE — 90715 TDAP VACCINE 7 YRS/> IM: CPT | Performed by: OBSTETRICS & GYNECOLOGY

## 2017-12-08 RX ORDER — OXYCODONE HYDROCHLORIDE AND ACETAMINOPHEN 5; 325 MG/1; MG/1
1 TABLET ORAL EVERY 4 HOURS PRN
Qty: 28 TABLET | Refills: 0 | Status: SHIPPED | OUTPATIENT
Start: 2017-12-08 | End: 2017-12-18

## 2017-12-08 RX ORDER — IBUPROFEN 600 MG/1
600 TABLET ORAL EVERY 6 HOURS PRN
Qty: 30 TABLET | Refills: 0
Start: 2017-12-08 | End: 2017-12-08

## 2017-12-08 RX ORDER — ACETAMINOPHEN 325 MG/1
TABLET ORAL
Qty: 30 TABLET | Refills: 0
Start: 2017-12-08 | End: 2019-12-31

## 2017-12-08 RX ORDER — IBUPROFEN 600 MG/1
600 TABLET ORAL EVERY 6 HOURS PRN
Qty: 30 TABLET | Refills: 0 | Status: SHIPPED | OUTPATIENT
Start: 2017-12-08 | End: 2019-12-31

## 2017-12-08 RX ADMIN — MEASLES, MUMPS, AND RUBELLA VIRUS VACCINE LIVE 0.5 ML: 1000; 12500; 1000 INJECTION, POWDER, LYOPHILIZED, FOR SUSPENSION SUBCUTANEOUS at 09:10

## 2017-12-08 RX ADMIN — Medication 1 TABLET: at 08:59

## 2017-12-08 RX ADMIN — ACETAMINOPHEN 650 MG: 325 TABLET, FILM COATED ORAL at 06:15

## 2017-12-08 RX ADMIN — DOCUSATE SODIUM 100 MG: 100 CAPSULE, LIQUID FILLED ORAL at 09:00

## 2017-12-08 RX ADMIN — ENOXAPARIN SODIUM 40 MG: 40 INJECTION SUBCUTANEOUS at 08:59

## 2017-12-08 RX ADMIN — TETANUS TOXOID, REDUCED DIPHTHERIA TOXOID AND ACELLULAR PERTUSSIS VACCINE, ADSORBED 0.5 ML: 5; 2.5; 8; 8; 2.5 SUSPENSION INTRAMUSCULAR at 09:07

## 2017-12-08 RX ADMIN — IBUPROFEN 600 MG: 600 TABLET ORAL at 08:59

## 2017-12-08 NOTE — PROGRESS NOTES
Progress Note - OB/GYN   Abdulaziz Dates 20 y o  female MRN: [id number]  Unit/Bed#: -01 Encounter: [id number]    Assessment:   Post Op Day #3 s/p LTCS, stable    Plan:  1  Continue routine post partum care   -Encourage ambulation   -Encourage breastfeeding  2  Abruption: PPH: EBL: 1000cc (300 intrapartum, 700 intraop)  VSS  3  PreE w/o SF   -Bps: 130s/70-80s, asymptomatic  4  Cholestasis  5  Lovenox  6  Hb: 12 4 -> 10 4  7  Anticipate discharge today    Subjective/Objective   Chief Complaint:     Post delivery, no complaints    Subjective:     Pain: yes, cramping, improved with meds  Tolerating PO: yes  Voiding: yes  Flatus: yes  BM: no  Ambulating: yes  Breastfeeding:  Yes  Chest pain: no  Shortness of breath: no  Leg pain: no  Lochia: minimal    Objective:     Vitals: /84   Pulse 62   Temp 97 9 °F (36 6 °C) (Oral)   Resp 18   Ht 5' 6" (1 676 m)   Wt 71 2 kg (157 lb)   LMP 03/23/2017   SpO2 97%   Breastfeeding?  Yes   BMI 25 34 kg/m²     No intake or output data in the 24 hours ending 12/08/17 0622    Lab Results   Component Value Date    WBC 10 72 (H) 12/06/2017    HGB 10 4 (L) 12/06/2017    HCT 31 2 (L) 12/06/2017    MCV 92 12/06/2017     12/06/2017       Current Facility-Administered Medications   Medication Dose Route Frequency    acetaminophen (TYLENOL) tablet 650 mg  650 mg Oral Q6H    acetaminophen (TYLENOL) tablet 650 mg  650 mg Oral Q6H PRN    aluminum-magnesium hydroxide-simethicone (MYLANTA) 200-200-20 mg/5 mL oral suspension 15 mL  15 mL Oral Q6H PRN    benzocaine-menthol-lanolin-aloe (DERMOPLAST) 20-0 5 % topical spray 1 application  1 application Topical A3V PRN    bisacodyl (DULCOLAX) rectal suppository 10 mg  10 mg Rectal Daily PRN    calcium carbonate (TUMS) chewable tablet 1,000 mg  1,000 mg Oral Daily PRN    diphenhydrAMINE (BENADRYL) tablet 25 mg  25 mg Oral Q6H PRN    docusate sodium (COLACE) capsule 100 mg  100 mg Oral BID PRN    enoxaparin (LOVENOX) subcutaneous injection 40 mg  40 mg Subcutaneous Q24H [address]    hydrocortisone 1 % cream 1 application  1 application Topical Daily PRN    ibuprofen (MOTRIN) tablet 600 mg  600 mg Oral Q6H PRN    lactated ringers infusion  125 mL/hr Intravenous Continuous    measles-mumps-rubella (M-M-R II) subcutaneous injection 0 5 mL  0 5 mL Subcutaneous Once    ondansetron (ZOFRAN) injection 4 mg  4 mg Intravenous Q8H PRN    oxyCODONE-acetaminophen (PERCOCET) 5-325 mg per tablet 1 tablet  1 tablet Oral Q6H PRN    oxyCODONE-acetaminophen (PERCOCET) 5-325 mg per tablet 1 tablet  1 tablet Oral Q4H PRN    oxyCODONE-acetaminophen (PERCOCET) 5-325 mg per tablet 2 tablet  2 tablet Oral Q4H PRN    prenatal multivitamin tablet 1 tablet  1 tablet Oral Daily    senna (SENOKOT) tablet 8 6 mg  1 tablet Oral HS PRN    simethicone (MYLICON) chewable tablet 80 mg  80 mg Oral 4x Daily PRN    sod citrate-citric acid (BICITRA) oral solution 30 mL  30 mL Oral 4x Daily PRN    tetanus-diphtheria-acellular pertussis (BOOSTRIX) IM injection 0 5 mL  0 5 mL Intramuscular Once    witch hazel-glycerin (TUCKS) topical pad 1 pad  1 pad Topical Q4H PRN       Physical Exam:     Gen: AAOx3, NAD  CV: RRR  Lungs: CTA b/l  Abd: Soft, non-tender, non-distended, no rebound or guarding  Uterine fundus firm and non-tender, 1 below umbilicus, incisions are c/d/i  Ext: Non tender    Kala Jauregui, PGY-1  OB/GYN  12/8/2017  6:22 AM

## 2017-12-08 NOTE — LACTATION NOTE
This note was copied from a baby's chart  Met with mother to go over feeding log since birth for the first week  Emphasized 8 or more (12) feedings in a 24 hour period, what to expect for the number of diapers per day of life and the progression of properties of the  stooling pattern  Discussed s/s that breastfeeding is going well after day 4 and when to get help from a pediatrician or lactation support person after day 4  Booklet included Breast Pumping Instructions, When You Go Back to Work or School, and Breastfeeding Resources for after discharge including access to the number for the [employer] and FOB to call for assistance, questions and concerns  Extension number for inpatient lactation support provided

## 2017-12-12 ENCOUNTER — ALLSCRIPTS OFFICE VISIT (OUTPATIENT)
Dept: OTHER | Facility: OTHER | Age: 22
End: 2017-12-12

## 2017-12-27 NOTE — PROGRESS NOTES
Discussion/Summary      Plan to continue pelvic rest, nothing in the vagina until post-partum visit  not life anything heavier than baby until post-partum visit  Motrin as needed for pain relief  2 weeks for post-partum visit with Dr Eduardo Barrett  The patient has the current Goals: Continue healing  The patent has the current Barriers: None  Patient is able to Self-Care  Possible side effects of new medications were reviewed with the patient/guardian today  The treatment plan was reviewed with the patient/guardian  The patient/guardian understands and agrees with the treatment plan                Self Referrals: No      Chief Complaint   Pt here for post op incision check and staple removal      Post-Op   HPI: Pt here for post op visit, incision check, and staple removal  Post-op check for Low Transverse  on  where a viable male  weighing 6lbs 14oz was delivered for non-reassuring FHR  denies any complications  in well controlled with just Motrin  regular bowel movements without difficulty and no longer taking Colace  problems with urination  breastfeeding and bottlefeeding, going well, no issues  problems with N/V, HA, fever, chills or signs of infection  minimal              Post-Op Gynecologic Surgery:      Kit Vane is status post performed on 17--   Low Transverse   HPI:      The patient reports no backache,-- normal PO intake,-- no fatigue,-- no fever,-- no excessive pain,-- not sexually active since surgery,-- no bloating,-- no loose stools,-- no nausea,-- passing flatus,-- no excessive vaginal bleeding-- and-- no pelvic pain  PE:      Abdominal Exam: soft,-- nontender,-- normal bowel sounds,-- no rebound tenderness,-- no guarding-- and-- no incisional hernia   The surgical incision site(s) was ecchymotic-- and-- Yellowish coloring of ecchymosis appreciated above entire incision, but-- clean, dry and intact,-- not warm,-- not indurated,-- not erythematous,-- not swollen-- and-- not tender  Assessment:      Post-op, the patient is doing well,-- has excellent pain control-- and-- is showing no signs of infection  Plan: Activity Restrictions: continue with pelvic rest, lift no more than weight of infant  Done this visit: remove sutures/staples  Patient instructed to follow up in 2-3 weeks,-- post-partum visit  no constipation no vomiting no vaginal discharge no vaginal odor      Review of Systems        Constitutional: No fever, no chills, feels well, no tiredness, no recent weight gain or weight loss  Cardiovascular: No complaints of slow heart rate, no fast heart rate, no chest pain, no palpitations, no leg claudication, no lower extremity edema  Respiratory: No complaints of shortness of breath, no wheezing, no cough, no SOB on exertion, no orthopnea, no PND  Gastrointestinal: No complaints of abdominal pain, no constipation, no nausea or vomiting, no diarrhea, no bloody stools  Genitourinary: No complaints of dysuria, no incontinence, no pelvic pain, no dysmenorrhea, no vaginal discharge or bleeding  Musculoskeletal: No complaints of arthralgias, no myalgias, no joint swelling or stiffness, no limb pain or swelling  Integumentary: No complaints of skin rash or lesions, no itching, no skin wounds, no breast pain or lump  Neurological: No complaints of headache, no confusion, no convulsions, no numbness, no dizziness or fainting, no tingling, no limb weakness, no difficulty walking  Psychiatric: Not suicidal, no sleep disturbance, no anxiety or depression, no change in personality, no emotional problems  Endocrine: No complaints of proptosis, no hot flashes, no muscle weakness, no deepening of the voice, no feelings of weakness  Hematologic/Lymphatic: No complaints of swollen glands, no swollen glands in the neck, does not bleed easily, does not bruise easily  Constitutional: No fever, no chills, feels well, no tiredness, no recent weight gain or loss  Cardiovascular: no complaints of slow or fast heart rate, no chest pain, no palpitations, no leg claudication or lower extremity edema  Respiratory: no complaints of shortness of breath, no wheezing, no dyspnea on exertion, no orthopnea or PND  Breasts: no complaints of breast pain, breast lump or nipple discharge  Gastrointestinal: no complaints of abdominal pain, no constipation, no nausea or diarrhea, no vomiting, no bloody stools  Genitourinary: no complaints of dysuria, no incontinence, no pelvic pain, no dysmenorrhea, no vaginal discharge or abnormal vaginal bleeding  Musculoskeletal: no complaints of arthralgia, no myalgia, no joint swelling or stiffness, no limb pain or swelling  Integumentary: no complaints of skin rash or lesion, no itching or dry skin, no skin wounds  Neurological: no complaints of headache, no confusion, no numbness or tingling, no dizziness or fainting  Active Problems   1  Cholestasis during pregnancy in third trimester (646 73,576 8) (O26 613,K83 1)   2  Encounter for prenatal care of first pregnancy, antepartum, third trimester (V22 0)     (Z34 03)   3  Itchy skin (698 9) (L29 9)   4  Pregnancy, obstetrical care (V22 1) (Z34 90)    Current Meds    1  PreNatal  MG Oral Capsule; Therapy: [id number] to Recorded    Allergies   1  No Known Drug Allergies  2  Seasonal    Vitals    Recorded: 23QSG1574 98:84PW   Systolic 193, LUE, Sitting   Diastolic 84, LUE, Sitting   Height 5 ft 6 in   Weight 141 lb    BMI Calculated 22 76   BSA Calculated 1 72     Attending Note   Collaborating Physician Note: Collaborating Note: I supervised the Advanced Practitioner-- and-- I agree with the Advanced Practitioner note   I discussed the case with the Advanced Practitioner and reviewed the AP note      Future Appointments      Date/Time Provider Specialty Site   01/03/2018 03:00 PM KELSEA Barajas   Obstetrics/Gynecology [facility]     Signatures    Electronically signed by : Chrissy Henderson; Dec 12 2017  2:40PM EST                       (Author)     Electronically signed by : KELSEA Handy ; Dec 26 2017 11:30AM EST                       (Author)

## 2018-01-09 NOTE — PROGRESS NOTES
Plan  Pregnancy, obstetrical care    · *1 - [address], genetic testing, Level II  ultrasound  Please contact our office prior to scheduling further testing  Status: Active -  Retrospective Authorization  Requested for: [id number]  Care Summary provided  : Yes   · (1) CYSTIC FIBROSIS PROFILE, DNA; Status:Active - Retrospective Authorization; Requested for:33Inu7765;    · (1) PRENATAL PANEL; Status:Active - Retrospective Authorization; Requested  for:[id number];    · (1) URINE CULTURE; Source:Urine, Clean Catch; Status:Active - Retrospective  Authorization; Requested for:06Dey1460; Active Problems    1  Cervical cancer screening (V76 2) (Z12 4)   2  Depo-Provera contraceptive status (V25 49) (Z30 40)   3  Pregnancy, obstetrical care (V22 1) (Z34 90)   4  Visit for routine gyn exam (V72 31) (Z01 419)    Current Meds    1  PreNatal  MG Oral Capsule; Therapy: 67HJD7386 to Recorded    2  Vitamin B-12 TABS; Therapy: (Recorded:52Vve0769) to Recorded    Allergies    1  No Known Drug Allergies    Results/Data  82553 Transvaginal Ultrasound OB [facility]:   Procedure: [id number] pregnant uterus real time with image documentation, fetal and maternal evaluation, first trimester (<14 weeks 0 days), transvaginal approach: single or first gestation  The study was done today in the office  Indication: EDC gestational age 6w9d with an CANDIE of 12/28/2017 weeks  Exam indication: New OB  Findings:   Number of gestational sacs and fetuses: one gestational sac containing one embryo  Gestational sac/fetal measurements appropriate for gestation:   CRL= 22 1mm  YS= 3 8mm  FHR= 180bpm    Survey of visible fetal and placental anatomic structure within normal limits  Qualitative assessment of amniotic fluid volume/gestational sac shape: within normal limits  Exam of maternal uterus and adnexa: within normal limits  Impression: Single, viable IUP consistent with dates   8w6d with an CANDIE of 12/127/2017 by today's ultrasound        Signatures   Electronically signed by : Mk Arredondo, ; May 23 2017  7:48AM EST                       (Author)    Electronically signed by : KELSEA Byers ; May 23 2017 11:42AM EST

## 2018-01-10 NOTE — PROGRESS NOTES
AUG 17 2017         RE: Rick Lo                                 To: [facility]   MR#: [id number]                                   [address]   :  [facility] [facility]   ENC: [id number]                             Fax: [phone]   (Exam #: [id number])      The LMP of this 24year old,  G1, P0-0-0-0 patient was MAR 23 2017, giving   her an CANDIE of DEC 28 2017 and a current gestational age of 21 weeks 0 days   by dates  A sonographic examination was performed on AUG 17 2017 using   real time equipment  The ultrasound examination was performed using   abdominal & vaginal techniques  The patient has a BMI of 21 5  Her blood   pressure today was 137/68  Earliest ultrasound found in her record: 17 8w6d 17 CANDIE      Thank you very much for your kind referral of Rick Lo to the   [address] [place] [address] in [place] on 2017 for level II ultrasound   evaluation and MFM assessment  Delores Joyner is a 39-year-old primigravida who   is currently at 21-0/7 weeks gestation by an estimated due date of   2017 which is based upon menstrual dating  Her prenatal   course so far has been unremarkable  Delores Joyner has no complaints  She   reports fetal movement and denies vaginal bleeding  She declined genetic   screening earlier in the pregnancy  Her past medical and surgical   histories are unremarkable  She currently takes no medication with the   exception of a prenatal vitamin on a daily basis and has no known drug   allergy  She denies tobacco, alcohol, or illicit drug use during the   pregnancy  The family genetic history is negative with respect to genetic   abnormalities, birth defects, or mental retardation  Her dad has   hypertension  The family medical history is otherwise negative with   respect to first degree relatives with hypertension, diabetes, venous   thromboembolism, or thyroid disease        Cardiac motion was observed at 147 bpm       INDICATIONS      fetal anatomical survey      Exam Types      LEVEL II   Transvaginal      RESULTS      Fetus # 1 of 1   Vertex presentation   Fetal growth appeared normal   Placenta Location = Posterior   No placenta previa   Placenta Grade = I      MEASUREMENTS (* Included In Average GA)      AC              16 3 cm        21 weeks 1 day * (52%)   BPD              5 3 cm        22 weeks 1 day * (72%)   HC              19 2 cm        21 weeks 3 days* (57%)   Femur            3 5 cm        21 weeks 1 day * (50%)      Nuchal Fold      3 2 mm   NBL              7 5 mm      Humerus          3 4 cm        21 weeks 4 days  (60%)      Cerebellum       2 3 cm        22 weeks 1 day   Biorbit          3 4 cm        21 weeks 5 days   CisternaMagna    6 9 mm      HC/AC           1 18   FL/AC           0 22   FL/BPD          0 66   EFW (Ac/Fl/Hc)   410 grams - 0 lbs 14 oz      THE AVERAGE GESTATIONAL AGE is 21 weeks 4 days +/- 10 days  AMNIOTIC FLUID         Largest Vertical Pocket = 3 9 cm   Amniotic Fluid: Normal      CERVICAL EVALUATION      The cervix appeared normal (Ultrasound Examination)  SUPINE      Cervical Length: 4 00 cm      OTHER TEST RESULTS           Funneling?: No             Dynamic Changes?: No        Resp  To TFP?: No                      Debris?: No      ANATOMY      Head                                    See Details   Face/Neck                               See Details   Th  Cav  Normal   Heart                                   Normal   Abd  Cav  Normal   Stomach                                 Normal   Right Kidney                            Normal   Left Kidney                             Normal   Bladder                                 Normal   Abd   Wall                               Normal   Spine                                   Normal   Extrems                                 Normal Genitalia                               Normal   Placenta                                Normal   Umbl  Cord                              Normal   Uterus                                  Normal   PCI                                     Normal      ANATOMY DETAILS      Visualized Appearing Sonographically Normal:   HEAD: (Calvarium, BPD Level, Lateral Ventricles, Choroid Plexus,   Cerebellum, Cisterna Magna);    FACE/NECK: (Neck, Nuchal Fold, Profile,   Orbits, Nose/Lips, Face);    TH  CAV  : (Lungs, Diaphragm); HEART: (Four   Chamber View, Proximal Left Outflow, Proximal Right Outflow, 3VV, 3 Vessel   Trachea, Short Axis of Greater Vessels, Ductal Arch, Aortic Arch,   Interventricular Septum, Interatrial Septum, IVC, SVC, Cardiac Axis,   Cardiac Position);    ABD  CAV : (Liver);    STOMACH, RIGHT KIDNEY, LEFT   KIDNEY, BLADDER, ABD  WALL, SPINE: (Cervical Spine, Thoracic Spine, Lumbar   Spine, Sacrum);    EXTREMS: (Lt Humerus, Rt Humerus, Lt Forearm, Rt   Forearm, Lt Hand, Rt Hand, Lt Femur, Rt Femur, Lt Low Leg, Rt Low Leg, Lt   Foot, Rt Foot);    GENITALIA (Male), PLACENTA, UMBL  CORD, UTERUS, PCI      Suboptimally Visualized:   HEAD: (Cavum);    FACE/NECK: (Palate)      ADNEXA      The left ovary appeared normal and measured 3 3 x 2 1 x 2 1 cm with a   volume of 7 6 cc  The right ovary was not visualized  IMPRESSION      Kaiser IUP   21 weeks and 4 days by this ultrasound  (CANDIE=DEC 24 2017)   Vertex presentation   Fetal growth appeared normal   Regular fetal heart rate of 147 bpm   Posterior placenta   No placenta previa      GENERAL COMMENT      No fetal structural abnormality or ultrasound marker for aneuploidy is   identified on the Level II ultrasound study today  The palate and cavum   septum pellucidum are suboptimally imaged secondary to the constraints   related to unfavorable fetal position   Fetal growth and amniotic fluid   volume are normal  A marginal placental cord insertion site is suspected  The placenta is otherwise normal in appearance  The cervix is normal in appearance by transvaginal sonography  The   cervical length is normal   Cervical debris is not present  Cervical   funneling is not present  Neither provocative nor dynamic change is   appreciated  Today's ultrasound findings and suggested follow-up were discussed in   detail with Paulette Elizmulu  We discussed that prenatal ultrasound cannot rule out   all congenital abnormalities  Paulette Bryant will return [facility] the [facility]    at 32 weeks gestation to assess interval growth and evaluate anatomic   targets not imaged well today  The face to face time, in addition to time spent discussing ultrasound   results, was approximately 10 minutes, greater than 50% of which was spent   during counseling and coordination of care  Leopoldo Ransom, R D M S Nani Ng, M D     Maternal-Fetal Medicine   Electronically signed 08/20/17 15:06

## 2018-01-11 NOTE — PROGRESS NOTES
OCT 30 2017         RE: Chidi Ventura                                 To: [facility]   MR#: [id number]                                   [address]   :  [place], [address] [place] [address]   ENC: [id number]                             Fax: [phone]   (Exam #: [id number])      The LMP of this 24year old,  G1, P0-0-0-0 patient was MAR 23 2017, giving   her an CANDIE of DEC 28 2017 and a current gestational age of 34 weeks 4 days   by dates  A sonographic examination was performed on OCT 30 2017 using   real time equipment  The ultrasound examination was performed using   abdominal technique  The patient has a BMI of 24 7  Her blood pressure   today was 108/73  Earliest ultrasound found in her record: 17 8w6d 17 CANDIE      Cardiac motion was observed at 127 bpm       INDICATIONS      missed anatomy follow up   fetal growth      Exam Types      Level I      RESULTS      Fetus # 1 of 1   Vertex presentation   Fetal growth appeared normal   Placenta Location = Posterior   Placenta Grade = I      MEASUREMENTS (* Included In Average GA)      AC              28 7 cm        32 weeks 6 days* (66%)   BPD              8 6 cm        34 weeks 4 days* (94%)   HC              30 0 cm        32 weeks 6 days* (55%)   Femur            6 1 cm        31 weeks 5 days* (51%)      Cerebellum       4 0 cm        33 weeks 3 days      HC/AC           1 05   FL/AC           0 21   FL/BPD          0 72   EFW (Ac/Fl/Hc)  1989 grams - 4 lbs 6 oz                 (57%)      THE AVERAGE GESTATIONAL AGE is 33 weeks 0 days +/- 18 days  AMNIOTIC FLUID      Q1: 4 2      Q2: 3 4      Q3: 5 5      Q4: 2 6   MT Total = 15 7 cm   Amniotic Fluid: Normal      ANATOMY COMMENTS      The prior fetal anatomic survey was limited the area of the palate and   cavum    The palate wase seen today as sonographically normal within the   inherent limitations of fetal ultrasound; however, the cavum could not be   visualized secondary to fetal position  Follow up intracranial anatomy   (calvarium,  lateral ventricles, and cerebellum), cardiac anatomy   (4chamber, LVOT, RVOT, and 3VV), diaphragm, stomach, kidneys, and bladder   appear normal       IMPRESSION      Kaiser IUP   33 weeks and 0 days by this ultrasound  (CANDIE=DEC 18 2017)   Vertex presentation   Fetal growth appeared normal   Regular fetal heart rate of 127 bpm   Posterior placenta      GENERAL COMMENT      On exam today the patient appears well, in no acute distress, and denies   any complaints  Her abdomen is non-tender  There has been appropriate interval fetal growth  Good fetal movement and   tone are seen  The amniotic fluid volume appears normal   The placenta is   posterior and it appears sonographically normal    The patient was   informed of today's findings and all of her questions were answered  The   limitations of ultrasound were reviewed with the patient   labor   precautions and fetal kick counts were reviewed  We discussed the need for follow-up  The patient had questions regarding   follow-up  We discussed that given today's reassuring fetal growth,   recommend follow-up as clinically indicated  The cavum septum pellucidum   has not been well visualized however the intracranial anatomy appears   otherwise reassuring  Thank you very much for allowing us to participate in the care of this   very nice patient  Should you have any questions, please do not hesitate   to contact our office  Please note, in addition to the time spent discussing the results of the   ultrasound, I spent approximately 10 minutes of face-to-face time with the   patient, greater than 50% of which was spent in counseling and the   coordination of care for this patient  PB Dailey M D     Electronically signed 10/30/17 14:52

## 2018-01-12 NOTE — MISCELLANEOUS
Message   Date: 19 Apr 2017 3:27 PM EST, Recorded By: Eleanor Yao For: Greg Mendes: Oliva Brown, Self   Phone: [phone] (Home)   Reason: Other   New Ob-- pts LMP 3/23/17  Yue Lightning pt will schedule her 9 week appts for around 5/24/17  Yue Lightning pt is already taking prenatal vitamins           Active Problems    1  Cervical cancer screening (V76 2) (Z12 4)   2  Depo-Provera contraceptive status (V25 49) (Z30 40)   3  Encounter for Depo-Provera contraception (V25 49) (Z30 42)   4  Encounter for initial prescription of contraceptives (V25 02) (Z30 019)   5  Other fatigue (780 79) (R53 83)   6  Visit for routine gyn exam (V72 31) (Z01 419)    Current Meds   1  MedroxyPROGESTERone Acetate 150 MG/ML Intramuscular Suspension; INJECT   EVERY 12 WEEKS AS DIRECTED; Therapy: 98GRT6515 to (Last Rx:11Jan2016)  Requested for: 18VMG6325 Ordered   2  Vitamin B-12 TABS; Therapy: (Recorded:08Oct2015) to Recorded    Allergies    1   No Known Drug Allergies    Signatures   Electronically signed by : Neal Clay, ; Apr 19 2017  3:28PM EST                       (Author)

## 2018-01-13 VITALS
BODY MASS INDEX: 21.38 KG/M2 | HEIGHT: 66 IN | DIASTOLIC BLOOD PRESSURE: 68 MMHG | WEIGHT: 133 LBS | SYSTOLIC BLOOD PRESSURE: 137 MMHG

## 2018-01-14 VITALS
BODY MASS INDEX: 25.07 KG/M2 | SYSTOLIC BLOOD PRESSURE: 120 MMHG | WEIGHT: 156 LBS | HEIGHT: 66 IN | DIASTOLIC BLOOD PRESSURE: 74 MMHG

## 2018-01-15 NOTE — MISCELLANEOUS
Message   Recorded as Task   Date: 01/26/2017 08:48 AM, Created By: Galen Romano   Task Name: Care Coordination   Assigned To: Umer Trivedi   Regarding Patient: Ralf Олег, Status: In Progress   Comment:    Opal Key - 26 Jan 2017 8:48 AM     TASK CREATED  I received a request form pts insurance company asking about gc chlam testing    the note said we did testing that day, but it doesn't look like the MAs ordered it and it was not done    what should I do? Janell Robin - 26 Jan 2017 9:44 AM     TASK REPLIED TO: Previously Assigned To Janell Robin  have her come in to have it done, there should be no charge for the visit   Opal Key - 26 Jan 2017 10:27 AM     TASK IN PROGRESS   Opal Key - 26 Jan 2017 12:43 PM     TASK REASSIGNED: Previously Assigned To Heidi Fernandez - 31 Jan 2017 4:13 PM     TASK EDITED  pt will do testing at her next yearly exam        Active Problems    1  Cervical cancer screening (V76 2) (Z12 4)   2  Depo-Provera contraceptive status (V25 49) (Z30 40)   3  Encounter for Depo-Provera contraception (V25 49) (Z30 42)   4  Encounter for initial prescription of contraceptives (V25 02) (Z30 019)   5  Other fatigue (780 79) (R53 83)   6  Visit for routine gyn exam (V72 31) (Z01 419)    Current Meds   1  MedroxyPROGESTERone Acetate 150 MG/ML Intramuscular Suspension; INJECT   EVERY 12 WEEKS AS DIRECTED; Therapy: 43FHH1095 to (Last Rx:11Jan2016)  Requested for: 42JXR0089 Ordered   2  Vitamin B-12 TABS; Therapy: (Recorded:08Oct2015) to Recorded    Allergies    1   No Known Drug Allergies    Signatures   Electronically signed by : Moni Avendaño, ; Jan 31 2017  4:13PM EST                       (Author)

## 2018-01-18 ENCOUNTER — GENERIC CONVERSION - ENCOUNTER (OUTPATIENT)
Dept: OTHER | Facility: OTHER | Age: 23
End: 2018-01-18

## 2018-01-22 VITALS — BODY MASS INDEX: 25.82 KG/M2 | SYSTOLIC BLOOD PRESSURE: 130 MMHG | WEIGHT: 160 LBS | DIASTOLIC BLOOD PRESSURE: 90 MMHG

## 2018-01-22 VITALS
SYSTOLIC BLOOD PRESSURE: 108 MMHG | WEIGHT: 153 LBS | DIASTOLIC BLOOD PRESSURE: 73 MMHG | HEIGHT: 66 IN | BODY MASS INDEX: 24.59 KG/M2

## 2018-01-22 VITALS
DIASTOLIC BLOOD PRESSURE: 68 MMHG | WEIGHT: 134 LBS | BODY MASS INDEX: 21.53 KG/M2 | HEIGHT: 66 IN | SYSTOLIC BLOOD PRESSURE: 108 MMHG

## 2018-01-22 VITALS
BODY MASS INDEX: 25.07 KG/M2 | WEIGHT: 156 LBS | SYSTOLIC BLOOD PRESSURE: 120 MMHG | HEIGHT: 66 IN | DIASTOLIC BLOOD PRESSURE: 68 MMHG

## 2018-01-22 VITALS
HEIGHT: 66 IN | WEIGHT: 143 LBS | DIASTOLIC BLOOD PRESSURE: 62 MMHG | SYSTOLIC BLOOD PRESSURE: 108 MMHG | BODY MASS INDEX: 22.98 KG/M2

## 2018-01-22 VITALS
SYSTOLIC BLOOD PRESSURE: 114 MMHG | DIASTOLIC BLOOD PRESSURE: 66 MMHG | WEIGHT: 149 LBS | BODY MASS INDEX: 23.95 KG/M2 | HEIGHT: 66 IN

## 2018-01-22 VITALS
HEIGHT: 66 IN | SYSTOLIC BLOOD PRESSURE: 130 MMHG | BODY MASS INDEX: 22.66 KG/M2 | DIASTOLIC BLOOD PRESSURE: 84 MMHG | WEIGHT: 141 LBS

## 2018-01-24 VITALS — WEIGHT: 158 LBS | DIASTOLIC BLOOD PRESSURE: 98 MMHG | SYSTOLIC BLOOD PRESSURE: 134 MMHG | BODY MASS INDEX: 25.5 KG/M2

## 2018-03-07 NOTE — PROGRESS NOTES
Education  Baby & Me Education 2nd Trimester:   Second Trimester Education provided:   benefits of breastfeeding, importance of exclusive breastfeeding, early initiation of breastfeeding, exclusive breastfeeding for the first 6 months, non-pharmacologic pain relief methods for labor, rooming-in on 24 hour basis and 28 week packet given  Mother has not registered for prenatal class  Thought has not been given to selecting a pediatrician  The mother has not discussed personal preferences with her provider  Prenatal education provided by: Aida Adams PA-C      Signatures   Electronically signed by :  Aida Adams, [facility]; Oct 16 2017  8:41AM EST                       (Author)

## 2018-03-07 NOTE — PROGRESS NOTES
Education  Baby & Me Education 1st Trimester:   First Trimester Education provided:   benefits of breastfeeding, importance of exclusive breastfeeding, early initiation of breastfeeding, exclusive breastfeeding for the first 6 months and Pregnancy Essentials Reference Guide given   The patient is not planning on breastfeeding  Prenatal education provided by: Gabi LEO      Signatures   Electronically signed by : GALLO Uribe;  Aug  1 2017  7:50PM EST                       (Author)

## 2018-03-19 ENCOUNTER — TELEPHONE (OUTPATIENT)
Dept: OBGYN CLINIC | Facility: CLINIC | Age: 23
End: 2018-03-19

## 2018-03-19 DIAGNOSIS — Z3A.01 LESS THAN 8 WEEKS GESTATION OF PREGNANCY: Primary | ICD-10-CM

## 2018-03-19 NOTE — TELEPHONE ENCOUNTER
Blood type is O+  She is going to go for bw at [facility] on [address]  In [place]

## 2018-03-20 LAB — B-HCG SERPL-ACNC: <2 MIU/ML

## 2018-07-09 ENCOUNTER — APPOINTMENT (EMERGENCY)
Dept: RADIOLOGY | Facility: HOSPITAL | Age: 23
End: 2018-07-09
Payer: COMMERCIAL

## 2018-07-09 ENCOUNTER — HOSPITAL ENCOUNTER (EMERGENCY)
Facility: HOSPITAL | Age: 23
Discharge: HOME | End: 2018-07-09
Attending: EMERGENCY MEDICINE
Payer: COMMERCIAL

## 2018-07-09 VITALS
OXYGEN SATURATION: 99 % | TEMPERATURE: 97.5 F | HEIGHT: 66 IN | SYSTOLIC BLOOD PRESSURE: 102 MMHG | BODY MASS INDEX: 20.89 KG/M2 | RESPIRATION RATE: 18 BRPM | HEART RATE: 96 BPM | DIASTOLIC BLOOD PRESSURE: 59 MMHG | WEIGHT: 130 LBS

## 2018-07-09 DIAGNOSIS — R00.2 PALPITATIONS: Primary | ICD-10-CM

## 2018-07-09 LAB
ALBUMIN SERPL BCP-MCNC: 4.3 G/DL (ref 3.5–5)
ALP SERPL-CCNC: 57 U/L (ref 46–116)
ALT SERPL W P-5'-P-CCNC: 27 U/L (ref 12–78)
ANION GAP SERPL CALCULATED.3IONS-SCNC: 8 MMOL/L (ref 4–13)
AST SERPL W P-5'-P-CCNC: 17 U/L (ref 5–45)
ATRIAL RATE: 123 BPM
BASOPHILS # BLD AUTO: 0.06 THOUSANDS/ΜL (ref 0–0.1)
BASOPHILS NFR BLD AUTO: 1 % (ref 0–1)
BILIRUB SERPL-MCNC: 1.32 MG/DL (ref 0.2–1)
BUN SERPL-MCNC: 11 MG/DL (ref 5–25)
CALCIUM SERPL-MCNC: 8.8 MG/DL (ref 8.3–10.1)
CHLORIDE SERPL-SCNC: 105 MMOL/L (ref 100–108)
CO2 SERPL-SCNC: 21 MMOL/L (ref 21–32)
CREAT SERPL-MCNC: 0.8 MG/DL (ref 0.6–1.3)
DEPRECATED D DIMER PPP: 405 NG/ML (FEU) (ref 0–424)
EOSINOPHIL # BLD AUTO: 0.13 THOUSAND/ΜL (ref 0–0.61)
EOSINOPHIL NFR BLD AUTO: 2 % (ref 0–6)
ERYTHROCYTE [DISTWIDTH] IN BLOOD BY AUTOMATED COUNT: 12.6 % (ref 11.6–15.1)
GFR SERPL CREATININE-BSD FRML MDRD: 104 ML/MIN/1.73SQ M
GLUCOSE SERPL-MCNC: 117 MG/DL (ref 65–140)
HCT VFR BLD AUTO: 41.6 % (ref 34.8–46.1)
HGB BLD-MCNC: 13.7 G/DL (ref 11.5–15.4)
IMM GRANULOCYTES # BLD AUTO: 0.02 THOUSAND/UL (ref 0–0.2)
IMM GRANULOCYTES NFR BLD AUTO: 0 % (ref 0–2)
LYMPHOCYTES # BLD AUTO: 2.39 THOUSANDS/ΜL (ref 0.6–4.47)
LYMPHOCYTES NFR BLD AUTO: 32 % (ref 14–44)
MCH RBC QN AUTO: 29.2 PG (ref 26.8–34.3)
MCHC RBC AUTO-ENTMCNC: 32.9 G/DL (ref 31.4–37.4)
MCV RBC AUTO: 89 FL (ref 82–98)
MONOCYTES # BLD AUTO: 0.4 THOUSAND/ΜL (ref 0.17–1.22)
MONOCYTES NFR BLD AUTO: 5 % (ref 4–12)
NEUTROPHILS # BLD AUTO: 4.58 THOUSANDS/ΜL (ref 1.85–7.62)
NEUTS SEG NFR BLD AUTO: 60 % (ref 43–75)
NRBC BLD AUTO-RTO: 0 /100 WBCS
P AXIS: 79 DEGREES
PLATELET # BLD AUTO: 225 THOUSANDS/UL (ref 149–390)
PMV BLD AUTO: 9.6 FL (ref 8.9–12.7)
POTASSIUM SERPL-SCNC: 3.4 MMOL/L (ref 3.5–5.3)
PR INTERVAL: 182 MS
PROT SERPL-MCNC: 8 G/DL (ref 6.4–8.2)
QRS AXIS: 83 DEGREES
QRSD INTERVAL: 74 MS
QT INTERVAL: 280 MS
QTC INTERVAL: 400 MS
RBC # BLD AUTO: 4.69 MILLION/UL (ref 3.81–5.12)
SODIUM SERPL-SCNC: 134 MMOL/L (ref 136–145)
T WAVE AXIS: 45 DEGREES
TROPONIN I SERPL-MCNC: <0.02 NG/ML
VENTRICULAR RATE: 123 BPM
WBC # BLD AUTO: 7.58 THOUSAND/UL (ref 4.31–10.16)

## 2018-07-09 PROCEDURE — 99285 EMERGENCY DEPT VISIT HI MDM: CPT

## 2018-07-09 PROCEDURE — 80053 COMPREHEN METABOLIC PANEL: CPT | Performed by: EMERGENCY MEDICINE

## 2018-07-09 PROCEDURE — 84484 ASSAY OF TROPONIN QUANT: CPT | Performed by: EMERGENCY MEDICINE

## 2018-07-09 PROCEDURE — 36415 COLL VENOUS BLD VENIPUNCTURE: CPT

## 2018-07-09 PROCEDURE — 85025 COMPLETE CBC W/AUTO DIFF WBC: CPT | Performed by: EMERGENCY MEDICINE

## 2018-07-09 PROCEDURE — 71046 X-RAY EXAM CHEST 2 VIEWS: CPT

## 2018-07-09 PROCEDURE — 96360 HYDRATION IV INFUSION INIT: CPT

## 2018-07-09 PROCEDURE — 85379 FIBRIN DEGRADATION QUANT: CPT

## 2018-07-09 PROCEDURE — 93010 ELECTROCARDIOGRAM REPORT: CPT | Performed by: INTERNAL MEDICINE

## 2018-07-09 PROCEDURE — 93005 ELECTROCARDIOGRAM TRACING: CPT

## 2018-07-09 RX ADMIN — SODIUM CHLORIDE 1000 ML: 0.9 INJECTION, SOLUTION INTRAVENOUS at 19:56

## 2018-07-09 NOTE — ED PROVIDER NOTES
History  Chief Complaint   Patient presents with    Chest Pain     pt with heart palpatations, sob, and pain between shoulder blades  pt states this has been happening for over a month  HPI  20 y o  Female presents to the ED with episode of palpitations ("like my heart is going fast") this evening  Pt states episode lasted about 10 minutes, and was associated with dull pain between her shoulder blades, chest tightness, and shortness of breath  Pt reports intermittent episodes of palpitations over the last month, but usually not with chest pain and shortness of breath  Episodes usually occur while sitting or laying still  Denies any dizziness, lightheadedness, nausea  No recent sickness  No personal or family history of cardiac problems  Prior to Admission Medications   Prescriptions Last Dose Informant Patient Reported? Taking? Prenatal MV-Min-Fe Fum-FA-DHA (PRENATAL 1 PO)   Yes No   Sig: Take by mouth daily   acetaminophen (TYLENOL) 325 mg tablet   No No   Sig: Take 1 - 2 tablets every 6 hours as needed  ibuprofen (MOTRIN) 600 mg tablet   No No   Sig: Take 1 tablet by mouth every 6 (six) hours as needed for mild pain      Facility-Administered Medications: None       History reviewed  No pertinent past medical history  Past Surgical History:   Procedure Laterality Date    [place]  DELIVERY ONLY  2017    Procedure:  SECTION (); Surgeon: Hitesh Ferguson MD;  Location: [facility] ;  Service: Obstetrics       History reviewed  No pertinent family history  I have reviewed and agree with the history as documented  Social History   Substance Use Topics    Smoking status: Never Smoker    Smokeless tobacco: Never Used    Alcohol use No        Review of Systems   Constitutional: Negative for chills and fever  HENT: Negative for congestion, rhinorrhea and sore throat  Respiratory: Positive for chest tightness and shortness of breath  Negative for cough      Cardiovascular: Positive for chest pain and palpitations  Negative for leg swelling  Gastrointestinal: Negative for abdominal pain, nausea and vomiting  Musculoskeletal: Negative for arthralgias, myalgias and neck pain  Neurological: Negative for dizziness, syncope and light-headedness  Psychiatric/Behavioral: The patient is nervous/anxious  All other systems reviewed and are negative  Physical Exam  ED Triage Vitals [07/09/18 1730]   Temperature Pulse Respirations Blood Pressure SpO2   97 5 °F (36 4 °C) (!) 139 18 122/83 98 %      Temp Source Heart Rate Source Patient Position - Orthostatic VS BP Location FiO2 (%)   Oral Monitor Sitting Left arm --      Pain Score       5           Orthostatic Vital Signs  Vitals:    07/09/18 1730 07/09/18 2000   BP: 122/83 102/59   Pulse: (!) 139 96   Patient Position - Orthostatic VS: Sitting Sitting       Physical Exam   Constitutional: She is oriented to person, place, and time  She appears well-developed and well-nourished  HENT:   Head: Normocephalic and atraumatic  Eyes: Conjunctivae are normal    Neck: Normal range of motion  Neck supple  Cardiovascular: Regular rhythm and normal heart sounds  Tachycardia present  Exam reveals no gallop and no friction rub  No murmur heard  Pulmonary/Chest: Effort normal and breath sounds normal  No respiratory distress  She has no wheezes  She has no rhonchi  She has no rales  She exhibits no tenderness  Abdominal: Soft  Bowel sounds are normal  She exhibits no distension and no mass  There is no tenderness  There is no rebound and no guarding  Musculoskeletal: Normal range of motion  Lymphadenopathy:     She has no cervical adenopathy  Neurological: She is alert and oriented to person, place, and time  Skin: Skin is warm and dry         ED Medications  Medications   sodium chloride 0 9 % bolus 1,000 mL (1,000 mL Intravenous New Bag 7/9/18 1956)       Diagnostic Studies  Results Reviewed     Procedure Component Value Units Date/Time    D-dimer, quantitative [[id number]]  (Normal) Collected:  07/09/18 1742    Lab Status:  Final result Specimen:  Blood from Arm, Right Updated:  07/09/18 1952     D-Dimer, Quant 405 ng/ml (FEU)     Comprehensive metabolic panel [[id number]]  (Abnormal) Collected:  07/09/18 1742    Lab Status:  Final result Specimen:  Blood from Arm, Right Updated:  07/09/18 1817     Sodium 134 (L) mmol/L      Potassium 3 4 (L) mmol/L      Chloride 105 mmol/L      CO2 21 mmol/L      Anion Gap 8 mmol/L      BUN 11 mg/dL      Creatinine 0 80 mg/dL      Glucose 117 mg/dL      Calcium 8 8 mg/dL      AST 17 U/L      ALT 27 U/L      Alkaline Phosphatase 57 U/L      Total Protein 8 0 g/dL      Albumin 4 3 g/dL      Total Bilirubin 1 32 (H) mg/dL      eGFR 104 ml/min/1 73sq m     Narrative:         National Kidney Disease Education Program recommendations are as follows:  GFR calculation is accurate only with a steady state creatinine  Chronic Kidney disease less than 60 ml/min/1 73 sq  meters  Kidney failure less than 15 ml/min/1 73 sq  meters      Troponin I [[id number]]  (Normal) Collected:  07/09/18 1742    Lab Status:  Final result Specimen:  Blood from Arm, Right Updated:  07/09/18 1814     Troponin I <0 02 ng/mL     CBC and differential [[id number]] Collected:  07/09/18 1742    Lab Status:  Final result Specimen:  Blood from Arm, Right Updated:  07/09/18 1804     WBC 7 58 Thousand/uL      RBC 4 69 Million/uL      Hemoglobin 13 7 g/dL      Hematocrit 41 6 %      MCV 89 fL      MCH 29 2 pg      MCHC 32 9 g/dL      RDW 12 6 %      MPV 9 6 fL      Platelets 491 Thousands/uL      nRBC 0 /100 WBCs      Neutrophils Relative 60 %      Immat GRANS % 0 %      Lymphocytes Relative 32 %      Monocytes Relative 5 %      Eosinophils Relative 2 %      Basophils Relative 1 %      Neutrophils Absolute 4 58 Thousands/µL      Immature Grans Absolute 0 02 Thousand/uL      Lymphocytes Absolute 2 39 Thousands/µL      Monocytes Absolute 0 40 Thousand/µL Eosinophils Absolute 0 13 Thousand/µL      Basophils Absolute 0 06 Thousands/µL                  X-ray chest 2 views    (Results Pending)         Procedures  ECG 12 Lead Documentation  Date/Time: 7/9/2018 6:32 PM  Performed by: Bijan Warner by: Grace Melendez     ECG reviewed by me, the ED Provider: yes    Patient location:  ED  Previous ECG:     Previous ECG:  Unavailable  Interpretation:     Interpretation: abnormal    Rate:     ECG rate assessment: tachycardic    Rhythm:     Rhythm: sinus rhythm    QRS:     QRS axis:  Normal  Conduction:     Conduction: normal    ST segments:     ST segments:  Normal  T waves:     T waves: normal    Other findings:     Other findings: LAE              Phone Consults  ED Phone Contact    ED Course  ED Course as of Jul 09 2026 Mon Jul 09, 2018 2013 D-Dimer negative  No findings on cardiac workup  Tachycardia improved  Plan to discharge with PCP followup  MDM  Number of Diagnoses or Management Options  Diagnosis management comments: 20 y o  Female with palpitations  Pt has negative cardiac workup (troponin, electrolytes, EKG, CXR)  She is tachycardic in the ED, will get D-dimer to rule out PE  CritCare Time    Disposition  Final diagnoses:   Palpitations     Time reflects when diagnosis was documented in both MDM as applicable and the Disposition within this note     Time User Action Codes Description Comment    7/9/2018  8:07 PM Ladonna Portillo Add [R00 2] Palpitations       ED Disposition     ED Disposition Condition Comment    Discharge  [facility] discharge to home/self care      Condition at discharge: Good        Follow-up Information     Follow up With Specialties Details Why Iris Payan  Call to set up a primary care doctor [phone]      Cassiecharu Eulalio, [address]   [place] [address]  [phone]            Patient's Medications   Discharge Prescriptions    No medications on file     No discharge procedures on file  ED Provider  Attending physically available and evaluated Balbina Peter I managed the patient along with the ED Attending      Electronically Signed by         Luis Wagner MD  07/09/18 2026

## 2018-07-09 NOTE — DISCHARGE INSTRUCTIONS
You were evaluated in the emergency department today for your chest pain and palpitations  Your lab work, EKG, and chest X-ray showed no evidence of acute cardiac abnormality or blood clot in your lungs  Please follow up with your primary care physician for continued evaluation of your symptoms  Return to the ER immediately if you become dizzy or lightheaded like you are going to pass out, have difficulty breathing, worsening chest pain, fever, any other new or concerning symptoms

## 2018-07-10 NOTE — ED NOTES
Pt requesting to have IV taken out and just drink water at home because she "needs to leave "       Perla Meadows, VILMA  07/09/18 2018

## 2018-07-11 NOTE — ED ATTENDING ATTESTATION
Marcia Mckeon DO, saw and evaluated the patient  I have discussed the patient with the resident/non-physician practitioner and agree with the resident's/non-physician practitioner's findings, Plan of Care, and MDM as documented in the resident's/non-physician practitioner's note, except where noted  All available labs and Radiology studies were reviewed  At this point I agree with the current assessment done in the Emergency Department  I have conducted an independent evaluation of this patient a history and physical is as follows:    79-year-old female complaining of palpitations and episodes of chest pain shortness of breath  Does note a fullness sensation between her shoulder blades  Does complain of the sensation of her heart racing and going very fast and pounding  Denies any recent change in medications, over-the-counter cough or cold medications, dietary supplements, alcohol or drug abuse  No previous history of cardiac evaluation for issues  Patient is a  with child is a 10month-old not breast feeding  patient is tachycardic on exam   The labs reviewed  Patient has a negative D-dimer  Heart rate improved with fluids and Toradol  Will treat for pleuritis with NSAIDs      Critical Care Time  CritCare Time    Procedures

## 2019-12-03 ENCOUNTER — TELEPHONE (OUTPATIENT)
Dept: OBGYN CLINIC | Facility: CLINIC | Age: 24
End: 2019-12-03

## 2019-12-03 NOTE — TELEPHONE ENCOUNTER
Called patient to remind her to bring her insurance card so we can scan it into her chart and get the information on the back for precert purposes

## 2019-12-03 NOTE — TELEPHONE ENCOUNTER
Patient called with new insurance information  Patient has [employer] ID# [id number]   Darby Raya is primary : 88

## 2019-12-04 ENCOUNTER — INITIAL PRENATAL (OUTPATIENT)
Dept: OBGYN CLINIC | Facility: CLINIC | Age: 24
End: 2019-12-04

## 2019-12-04 ENCOUNTER — TELEPHONE (OUTPATIENT)
Dept: OBGYN CLINIC | Facility: CLINIC | Age: 24
End: 2019-12-04

## 2019-12-04 VITALS
HEIGHT: 66 IN | BODY MASS INDEX: 19.83 KG/M2 | DIASTOLIC BLOOD PRESSURE: 76 MMHG | WEIGHT: 123.4 LBS | SYSTOLIC BLOOD PRESSURE: 114 MMHG

## 2019-12-04 DIAGNOSIS — Z34.81 MULTIGRAVIDA IN FIRST TRIMESTER: Primary | ICD-10-CM

## 2019-12-04 PROBLEM — Z87.59 HISTORY OF CHOLESTASIS DURING PREGNANCY: Status: ACTIVE | Noted: 2019-12-04

## 2019-12-04 PROBLEM — Z87.19 HISTORY OF CHOLESTASIS DURING PREGNANCY: Status: ACTIVE | Noted: 2019-12-04

## 2019-12-04 PROBLEM — Z3A.36 36 WEEKS GESTATION OF PREGNANCY: Status: RESOLVED | Noted: 2017-12-04 | Resolved: 2019-12-04

## 2019-12-04 PROBLEM — O45.90 PLACENTAL ABRUPTION: Status: RESOLVED | Noted: 2017-12-05 | Resolved: 2019-12-04

## 2019-12-04 PROCEDURE — PNV: Performed by: PHYSICIAN ASSISTANT

## 2019-12-04 PROCEDURE — 87591 N.GONORRHOEAE DNA AMP PROB: CPT | Performed by: PHYSICIAN ASSISTANT

## 2019-12-04 PROCEDURE — 87491 CHLMYD TRACH DNA AMP PROBE: CPT | Performed by: PHYSICIAN ASSISTANT

## 2019-12-04 PROCEDURE — G0145 SCR C/V CYTO,THINLAYER,RESCR: HCPCS | Performed by: PHYSICIAN ASSISTANT

## 2019-12-04 PROCEDURE — 87661 TRICHOMONAS VAGINALIS AMPLIF: CPT | Performed by: PHYSICIAN ASSISTANT

## 2019-12-04 PROCEDURE — 87070 CULTURE OTHR SPECIMN AEROBIC: CPT | Performed by: PHYSICIAN ASSISTANT

## 2019-12-04 NOTE — PROGRESS NOTES
Pt for NOB  Pap, cxs and PE done  Pt os in the process of getting appt to see cardiology to work up for possible POTS diagnosis  Had been having increased fatigue, SOB, poor circulation as wel as dizziness and hypotension w position change  TAUS done  + FHR s=d c/w CANDIE by LMP, keep CANDIE  Pt feels relatively well other than sx listed above  Pt considering genetic testing  Info given and advised to call MFM in the next week if they desire to schedule  No h/o MRSA, had Varicella as a child, plans to breast feed  Pt does likely plan repeat   Had h/o cholestasis and preeclampsia in her last pregnancy  Renetta PNV  Slip written initial OB panel plus CMP due to h/o preeclampsia in previous pregnancy  I advised pt to begin 81 mg of ASA at this point

## 2019-12-04 NOTE — TELEPHONE ENCOUNTER
Per May, 12/4/19, Ref  # [id number], effective and active as of 12/1/2018, runs on a calendar year; as long as both provider and hospital are contracted with our local BC/BS PPO, we are in network; $750 individual deductible, zero met; $1500 family deductible, $750 met; 80/20 co-insurance, applies to OOP; OOP $3000 individual, $76 73 met; $6000 family OOP, $8880 53 met; follows 48/96 hour rule    12/4/19 cu

## 2019-12-06 LAB
BACTERIA GENITAL AEROBE CULT: NORMAL
LAB AP GYN PRIMARY INTERPRETATION: NORMAL
Lab: NORMAL

## 2019-12-07 LAB
C TRACH DNA SPEC QL NAA+PROBE: NEGATIVE
N GONORRHOEA DNA SPEC QL NAA+PROBE: NEGATIVE
T VAGINALIS RRNA SPEC QL NAA+PROBE: NEGATIVE

## 2019-12-13 LAB
EXTERNAL ANTIBODY SCREEN: NORMAL
EXTERNAL HEMATOCRIT: 38.5 %
EXTERNAL HEMOGLOBIN: 12.8 G/DL
EXTERNAL HEPATITIS B SURFACE ANTIGEN: NORMAL
EXTERNAL HIV-1/2 AB-AG: NORMAL
EXTERNAL PLATELET COUNT: 216 K/ΜL
EXTERNAL RH FACTOR: POSITIVE
EXTERNAL RUBELLA IGG QUANTITATION: NORMAL
EXTERNAL SYPHILIS RPR SCREEN: NORMAL

## 2019-12-17 LAB
ABO GROUP BLD: NORMAL
ALBUMIN SERPL-MCNC: 4.5 G/DL (ref 3.6–5.1)
ALBUMIN/GLOB SERPL: 1.7 (CALC) (ref 1–2.5)
ALP SERPL-CCNC: 35 U/L (ref 33–115)
ALT SERPL-CCNC: 13 U/L (ref 6–29)
APPEARANCE UR: CLEAR
AST SERPL-CCNC: 13 U/L (ref 10–30)
BACTERIA UR QL AUTO: ABNORMAL /HPF
BASOPHILS # BLD AUTO: 29 CELLS/UL (ref 0–200)
BASOPHILS NFR BLD AUTO: 0.5 %
BILIRUB SERPL-MCNC: 2.1 MG/DL (ref 0.2–1.2)
BILIRUB UR QL STRIP: NEGATIVE
BLD GP AB SCN SERPL QL: NORMAL
BUN SERPL-MCNC: 11 MG/DL (ref 7–25)
BUN/CREAT SERPL: ABNORMAL (CALC) (ref 6–22)
CALCIUM SERPL-MCNC: 9.6 MG/DL (ref 8.6–10.2)
CAOX CRY #/AREA URNS HPF: ABNORMAL /HPF
CHLORIDE SERPL-SCNC: 101 MMOL/L (ref 98–110)
CO2 SERPL-SCNC: 27 MMOL/L (ref 20–32)
COLOR UR: YELLOW
CREAT SERPL-MCNC: 0.57 MG/DL (ref 0.5–1.1)
EOSINOPHIL # BLD AUTO: 103 CELLS/UL (ref 15–500)
EOSINOPHIL NFR BLD AUTO: 1.8 %
ERYTHROCYTE [DISTWIDTH] IN BLOOD BY AUTOMATED COUNT: 12.8 % (ref 11–15)
GLOBULIN SER CALC-MCNC: 2.7 G/DL (CALC) (ref 1.9–3.7)
GLUCOSE SERPL-MCNC: 82 MG/DL (ref 65–99)
GLUCOSE UR QL STRIP: NEGATIVE
HBV SURFACE AG SERPL QL IA: NORMAL
HCT VFR BLD AUTO: 38.5 % (ref 35–45)
HCV AB S/CO SERPL IA: 0.01
HCV AB SERPL QL IA: NORMAL
HGB BLD-MCNC: 12.8 G/DL (ref 11.7–15.5)
HGB UR QL STRIP: NEGATIVE
HIV 1+2 AB+HIV1 P24 AG SERPL QL IA: NORMAL
HYALINE CASTS #/AREA URNS LPF: ABNORMAL /LPF
KETONES UR QL STRIP: NEGATIVE
LEUKOCYTE ESTERASE UR QL STRIP: ABNORMAL
LYMPHOCYTES # BLD AUTO: 1402 CELLS/UL (ref 850–3900)
LYMPHOCYTES NFR BLD AUTO: 24.6 %
MCH RBC QN AUTO: 29.9 PG (ref 27–33)
MCHC RBC AUTO-ENTMCNC: 33.2 G/DL (ref 32–36)
MCV RBC AUTO: 90 FL (ref 80–100)
MONOCYTES # BLD AUTO: 279 CELLS/UL (ref 200–950)
MONOCYTES NFR BLD AUTO: 4.9 %
NEUTROPHILS # BLD AUTO: 3887 CELLS/UL (ref 1500–7800)
NEUTROPHILS NFR BLD AUTO: 68.2 %
NITRITE UR QL STRIP: NEGATIVE
PH UR STRIP: 7 [PH] (ref 5–8)
PLATELET # BLD AUTO: 216 THOUSAND/UL (ref 140–400)
PMV BLD REES-ECKER: 9.7 FL (ref 7.5–12.5)
POTASSIUM SERPL-SCNC: 3.9 MMOL/L (ref 3.5–5.3)
PROT SERPL-MCNC: 7.2 G/DL (ref 6.1–8.1)
PROT UR QL STRIP: NEGATIVE
RBC # BLD AUTO: 4.28 MILLION/UL (ref 3.8–5.1)
RBC #/AREA URNS HPF: ABNORMAL /HPF
RH BLD: NORMAL
RPR SER QL: NORMAL
RUBV IGG SERPL IA-ACNC: 1.14 INDEX
SL AMB EGFR AFRICAN AMERICAN: 149 ML/MIN/1.73M2
SL AMB EGFR NON AFRICAN AMERICAN: 129 ML/MIN/1.73M2
SODIUM SERPL-SCNC: 136 MMOL/L (ref 135–146)
SP GR UR STRIP: 1.02 (ref 1–1.03)
SQUAMOUS #/AREA URNS HPF: ABNORMAL /HPF
WBC # BLD AUTO: 5.7 THOUSAND/UL (ref 3.8–10.8)
WBC #/AREA URNS HPF: ABNORMAL /HPF

## 2019-12-31 ENCOUNTER — ROUTINE PRENATAL (OUTPATIENT)
Dept: OBGYN CLINIC | Facility: CLINIC | Age: 24
End: 2019-12-31

## 2019-12-31 VITALS
BODY MASS INDEX: 20.03 KG/M2 | DIASTOLIC BLOOD PRESSURE: 74 MMHG | HEIGHT: 66 IN | WEIGHT: 124.6 LBS | SYSTOLIC BLOOD PRESSURE: 110 MMHG

## 2019-12-31 DIAGNOSIS — Z87.19 HISTORY OF CHOLESTASIS DURING PREGNANCY: ICD-10-CM

## 2019-12-31 DIAGNOSIS — Z34.81 PRENATAL CARE, SUBSEQUENT PREGNANCY, FIRST TRIMESTER: Primary | ICD-10-CM

## 2019-12-31 DIAGNOSIS — Z87.59 HISTORY OF CHOLESTASIS DURING PREGNANCY: ICD-10-CM

## 2019-12-31 PROCEDURE — PNV: Performed by: PHYSICIAN ASSISTANT

## 2019-12-31 NOTE — PROGRESS NOTES
Visit: Reports nausea, improving  Reports menstrual like cramping, reviewed hydration  No N/V, HA, VB, LOF, edema, domestic violence, or smoking  Tolerating PNV  Patient hx of cholestasis in previous pregnancy  Reports noticed itching on lower legs for a few days last week, still notices but not as bad this week  Similar to how she presented in last pregnancy  Did have recent CMP showed elevated bilirubin  Otherwise normal  Will get bile acids  Continue routine prenatal care  Return to office in 4 weeks for ob check

## 2020-01-14 ENCOUNTER — TELEPHONE (OUTPATIENT)
Dept: OBGYN CLINIC | Facility: CLINIC | Age: 25
End: 2020-01-14

## 2020-01-14 DIAGNOSIS — Z3A.14 14 WEEKS GESTATION OF PREGNANCY: Primary | ICD-10-CM

## 2020-01-31 ENCOUNTER — ROUTINE PRENATAL (OUTPATIENT)
Dept: OBGYN CLINIC | Facility: CLINIC | Age: 25
End: 2020-01-31

## 2020-01-31 VITALS
BODY MASS INDEX: 20.86 KG/M2 | WEIGHT: 129.8 LBS | SYSTOLIC BLOOD PRESSURE: 118 MMHG | DIASTOLIC BLOOD PRESSURE: 74 MMHG | HEIGHT: 66 IN

## 2020-01-31 DIAGNOSIS — Z3A.17 17 WEEKS GESTATION OF PREGNANCY: Primary | ICD-10-CM

## 2020-01-31 DIAGNOSIS — Z34.82 PRENATAL CARE, SUBSEQUENT PREGNANCY IN SECOND TRIMESTER: ICD-10-CM

## 2020-01-31 PROCEDURE — PNV: Performed by: OBSTETRICS & GYNECOLOGY

## 2020-02-04 DIAGNOSIS — O26.612 CHOLESTASIS DURING PREGNANCY IN SECOND TRIMESTER: Primary | ICD-10-CM

## 2020-02-04 DIAGNOSIS — K83.1 CHOLESTASIS DURING PREGNANCY IN SECOND TRIMESTER: Primary | ICD-10-CM

## 2020-02-04 LAB — BILE AC SERPL-SCNC: 11 UMOL/L (ref 0–19)

## 2020-02-12 ENCOUNTER — ROUTINE PRENATAL (OUTPATIENT)
Dept: PERINATAL CARE | Facility: OTHER | Age: 25
End: 2020-02-12
Payer: COMMERCIAL

## 2020-02-12 VITALS
HEART RATE: 81 BPM | WEIGHT: 131.39 LBS | SYSTOLIC BLOOD PRESSURE: 116 MMHG | HEIGHT: 66 IN | BODY MASS INDEX: 21.12 KG/M2 | DIASTOLIC BLOOD PRESSURE: 73 MMHG

## 2020-02-12 DIAGNOSIS — O26.619 CHOLESTASIS DURING PREGNANCY, ANTEPARTUM: ICD-10-CM

## 2020-02-12 DIAGNOSIS — Z3A.18 18 WEEKS GESTATION OF PREGNANCY: Primary | ICD-10-CM

## 2020-02-12 DIAGNOSIS — Z36.86 ENCOUNTER FOR ANTENATAL SCREENING FOR CERVICAL LENGTH: ICD-10-CM

## 2020-02-12 DIAGNOSIS — K83.1 CHOLESTASIS DURING PREGNANCY, ANTEPARTUM: ICD-10-CM

## 2020-02-12 DIAGNOSIS — K83.1 CHOLESTASIS DURING PREGNANCY IN SECOND TRIMESTER: ICD-10-CM

## 2020-02-12 DIAGNOSIS — O26.612 CHOLESTASIS DURING PREGNANCY IN SECOND TRIMESTER: ICD-10-CM

## 2020-02-12 DIAGNOSIS — Z36.89 ENCOUNTER FOR FETAL ANATOMIC SURVEY: ICD-10-CM

## 2020-02-12 PROBLEM — O43.119 CIRCUMVALLATE PLACENTA: Status: ACTIVE | Noted: 2020-02-12

## 2020-02-12 PROBLEM — O09.299 HX OF PREECLAMPSIA, PRIOR PREGNANCY, CURRENTLY PREGNANT: Status: ACTIVE | Noted: 2020-02-12

## 2020-02-12 PROBLEM — O34.219 HISTORY OF CESAREAN DELIVERY AFFECTING PREGNANCY: Status: ACTIVE | Noted: 2017-12-05

## 2020-02-12 PROBLEM — O09.899 HISTORY OF PRETERM DELIVERY, CURRENTLY PREGNANT: Status: ACTIVE | Noted: 2020-02-12

## 2020-02-12 PROCEDURE — 76817 TRANSVAGINAL US OBSTETRIC: CPT | Performed by: OBSTETRICS & GYNECOLOGY

## 2020-02-12 PROCEDURE — 76805 OB US >/= 14 WKS SNGL FETUS: CPT | Performed by: OBSTETRICS & GYNECOLOGY

## 2020-02-12 PROCEDURE — 99242 OFF/OP CONSLTJ NEW/EST SF 20: CPT | Performed by: OBSTETRICS & GYNECOLOGY

## 2020-02-12 NOTE — PROGRESS NOTES
A transvaginal ultrasound was performed   Sonographer note on use of High Level Disinfection Process (Trophon) for transvaginal probe 2 used, serial #[id number]  Coty Moura, [facility]

## 2020-02-12 NOTE — ASSESSMENT & PLAN NOTE
We discussed the finding of a suspected circumvallate placenta (CP), in which the chorionic membrane does not insert at the edge of the placenta, but at some inward distance from the margin  The placenta edge is thickened and rolled up and the edge protrudes into the uterine cavity  This finding occurs in up to 11% of early second trimester ultrasounds, and persists in 1-2% of pregnancies at term  The prognosis is generally good when partial and isolated  Because of a slightly increased risk of  birth, placental abruption, and growth restriction, I do recommend re-evaluation of fetal growth once in the third trimester for this indication  Serial surveillance is already planned in light of cholestasis

## 2020-02-12 NOTE — ASSESSMENT & PLAN NOTE
Although encouraging, even a normal-appearing ultrasound cannot exclude all malformations, or the possibility of a genetic syndrome  We reviewed that the anatomic survey is incomplete today, but no malformations were suspected  She will follow-up to complete the anatomic survey during surveillance for cholestasis  She was offered genetic screening and testing options for aneuploidy and declined

## 2020-02-12 NOTE — LETTER
2020     Farrah Carrera PA-C  [address]    Patient: Tony Granado   YOB: 1995   Date of Visit: 2020       Dear Dr Yamile Vaca: Thank you for referring Tony Granado to me for evaluation  Below are my notes for this consultation  If you have questions, please do not hesitate to call me  I look forward to following your patient along with you  Sincerely,        Luther Mcdonald MD        CC: No Recipients  Luther Mcdonald MD  2020  2:05 PM  Sign at close encounter  CONSULTATION: MATERNAL-FETAL MEDICINE    Dear Farrah Carrera, [address] [place] [address], [address] [facility],    Thank you very much for your kind referral of patient Tony Granado for Maternal-Fetal Medicine consultation  She presents with her partner Fer Duran  As you know, Ms Aguila Nicholson is a 21y o  year-old  at 23w3d presenting for consultation for anatomic survey and to discuss cholestasis of pregnancy  She has no complaints today      Her Antepartum course is significant for:   Patient Active Problem List   Diagnosis    History of  delivery affecting pregnancy    Multigravida in first trimester    Cholestasis during pregnancy    Prenatal care, subsequent pregnancy in second trimester    18 weeks gestation of pregnancy    Circumvallate placenta    History of  delivery, currently pregnant    Hx of preeclampsia, prior pregnancy, currently pregnant       Obstetric History:  OB History    Para Term  AB Living   2 1   1   1   SAB TAB Ectopic Multiple Live Births         0 1      # Outcome Date GA Lbr Tim/2nd Weight Sex Delivery Anes PTL Lv   2 Current            1  17 36w5d  3130 g (6 lb 14 4 oz) M CS-LTranv EPI Y DANITA      Complications: Fetal Intolerance, Abruptio Placenta       Past Medical History:  Past Medical History:   Diagnosis Date    Cholestasis     CURRENTLY AND IN LAST PREGNANCY     Placental abruption     PAST PREGNANCY    Pre-eclampsia     PRIOR PREGNANCY        Past Surgical History:  Past Surgical History:   Procedure Laterality Date    MOUTH SURGERY      IMPLANTS     [place]  DELIVERY ONLY  2017    Procedure:  SECTION (); Surgeon: Kacey Hogan MD;  Location: [facility] ;  Service: Obstetrics    WISDOM TOOTH EXTRACTION         Social History:   She denies current use of alcohol, drugs of abuse, tobacco, and marijuana products  Family History:  Family history was reviewed using an office screening tool, and is negative for congenital anomalies, genetic diseases, and thromboembolism in first degree relatives of this pregnancy  Family history is notable for early onset cancer on both sides of the family, and hypertension on Tigist's side  Medications:    Current Outpatient Medications:     Prenatal MV-Min-Fe Fum-FA-DHA (PRENATAL 1 PO), Take by mouth daily, Disp: , Rfl:     Allergies: Allergies   Allergen Reactions    Pollen Extract        Review of Systems:  Pertinent items are noted in HPI  Exam:  Vitals: Blood pressure 116/73, pulse 81, height 5' 6" (1 676 m), weight 59 6 kg (131 lb 6 3 oz), last menstrual period 10/05/2019  General appearance: alert, well appearing, and in no distress  The remainder of her physical examination was deferred as she was here today for consultation and discussion  Ultrasound findings today are as follows: There is a single live intrauterine pregnancy with size equivalent to dates  No gross anomalies were identified however the anatomic survey is incomplete  Amniotic fluid is within normal limits  Transvaginal ultrasound was performed in conjunction with a transabdominal ultrasound to better visualize the cervix  Cervical length measures 40mm indicating low risk for spontaneous  birth  Placenta appears circumvallate        My recommendations are as follows:     18 weeks gestation of pregnancy  Although encouraging, even a normal-appearing ultrasound cannot exclude all malformations, or the possibility of a genetic syndrome  We reviewed that the anatomic survey is incomplete today, but no malformations were suspected  She will follow-up to complete the anatomic survey during surveillance for cholestasis  She was offered genetic screening and testing options for aneuploidy and declined  Circumvallate placenta  We discussed the finding of a suspected circumvallate placenta (CP), in which the chorionic membrane does not insert at the edge of the placenta, but at some inward distance from the margin  The placenta edge is thickened and rolled up and the edge protrudes into the uterine cavity  This finding occurs in up to 11% of early second trimester ultrasounds, and persists in 1-2% of pregnancies at term  The prognosis is generally good when partial and isolated  Because of a slightly increased risk of  birth, placental abruption, and growth restriction, I do recommend re-evaluation of fetal growth once in the third trimester for this indication  Serial surveillance is already planned in light of cholestasis  Cholestasis during pregnancy  We reviewed the finding of recurrent cholestasis of pregnancy, which is presently asymptomatic  Her bile acids were 11 on 20, which is decreased from 46 at the end of her prior pregnancy in 2017  She is not reporting itching presently and is not on ursodiol  LFTs are normal this pregnancy, but she does have an elevated Tbili of 2 1, of uncertain significance  She has been tested for Hepatitis C and is negative  We discussed that it is atypical to have such early onset of cholestasis, but that her history of cholestasis in a prior pregnancy does put her at increased risk of recurrence in subsequent pregnancies   I advised consultation by gastroenterology and a right upper quadrant ultrasound to evaluate for any underlying hepatobiliary disease, especially in light of elevated bilirubin level  We reviewed that obstetric surveillance for cholestasis entails serial evaluation of fetal growth every 4 weeks, and twice weekly antepartum fetal surveillance starting at 32 weeks gestation  Delivery is recommended between 36 0/7 and 37 0/7 weeks gestation  I do recommend re-evaluating bile acids at 32-34 weeks gestation, because bile acid levels >100 are associated with a particularly high risk of stillbirth and may necessitate delivery before 36 weeks  If delivery is planned prior to 37 0/7 weeks gestation, I would also advise considering  corticosteroids, which we discussed  Hx of preeclampsia, prior pregnancy, currently pregnant  The use of low dose aspirin in pregnancy (81-162mg) is recommended in women with a high risk, or multiple moderate risk factors for preeclampsia  Aspirin therapy should be initiated between 12-28 weeks gestation, and is most effective if started prior to 16 weeks gestation, and continued until delivery  Low dose aspirin in pregnancy has been shown to reduce the incidence of preeclampsia in women with risk factors, and has been shown to be safe and without significant maternal or fetal risk  In light of her risk factors which include prior preeclampsia, I recommend initiating aspirin therapy  I inadvertently did not discuss with her at today's visit, so it should be offered at subsequent visit  History of  delivery affecting pregnancy  Ms Basim Spaulding has a history of 1 prior  delivery  Most women with one or two prior low-transverse uterine incisions are candidates for trial of labor after  (TOLAC)  The majority of women attempting a trial of labor after  will succeed, but individual chance of success varies based on individual patient factors, including the circumstances of prior  delivery/deliveries   The most significant, but fortunately rare, risk of TOLAC is uterine rupture, which occurs in 0 5-0 9% of women attempting a TOLAC, and is associated with risks of maternal and fetal morbidity and mortality  Decision regarding mode of delivery (planned repeat  versus TOLAC) should be made after counseling by the patient and her obstetric care provider  Individual chance of success can be estimated using a predictive tool ( calculator), available at [email or web address]/MU/VGBirthCalc/vagbirth html  Her planned mode of delivery at present is repeat   Evaluation and Management:  The patient was counseled regarding the above findings  The limitations of ultrasound were reviewed  The approximate face-to-face time was 30 minutes  The majority of time (>50%) was spent counseling and/or coordinating care with the patient and/or family members  At the conclusion of today's encounter, all questions were answered to her satisfaction  Thank you very much for this kind referral and please do not hesitate to contact me with any further questions or concerns      Sincerely,    Madalyn Gallagher MD  Attending Physician, [place]

## 2020-02-12 NOTE — ASSESSMENT & PLAN NOTE
Ms Howell has a history of 1 prior  delivery  Most women with one or two prior low-transverse uterine incisions are candidates for trial of labor after  (TOLAC)  The majority of women attempting a trial of labor after  will succeed, but individual chance of success varies based on individual patient factors, including the circumstances of prior  delivery/deliveries  The most significant, but fortunately rare, risk of TOLAC is uterine rupture, which occurs in 0 5-0 9% of women attempting a TOLAC, and is associated with risks of maternal and fetal morbidity and mortality  Decision regarding mode of delivery (planned repeat  versus TOLAC) should be made after counseling by the patient and her obstetric care provider  Individual chance of success can be estimated using a predictive tool ( calculator), available at [email or web address]/CHANI/[email or web address]/[email or web address] html  Her planned mode of delivery at present is repeat

## 2020-02-12 NOTE — PROGRESS NOTES
CONSULTATION: MATERNAL-FETAL MEDICINE    Dear Zonia Burr, [address] [place] [address], [facility],    Thank you very much for your kind referral of patient Meghan Raymond for Maternal-Fetal Medicine consultation  She presents with her partner Evaristo Nath  As you know, Ms Sheela Vazquez is a 21y o  year-old  at 23w3d presenting for consultation for anatomic survey and to discuss cholestasis of pregnancy  She has no complaints today  Her Antepartum course is significant for:   Patient Active Problem List   Diagnosis    History of  delivery affecting pregnancy    Multigravida in first trimester    Cholestasis during pregnancy    Prenatal care, subsequent pregnancy in second trimester    18 weeks gestation of pregnancy    Circumvallate placenta    History of  delivery, currently pregnant    Hx of preeclampsia, prior pregnancy, currently pregnant       Obstetric History:  OB History    Para Term  AB Living   2 1   1   1   SAB TAB Ectopic Multiple Live Births         0 1      # Outcome Date GA Lbr Tim/2nd Weight Sex Delivery Anes PTL Lv   2 Current            1  17 36w5d  3130 g (6 lb 14 4 oz) M CS-LTranv EPI Y DANITA      Complications: Fetal Intolerance, Abruptio Placenta       Past Medical History:  Past Medical History:   Diagnosis Date    Cholestasis     CURRENTLY AND IN LAST PREGNANCY     Placental abruption     PAST PREGNANCY    Pre-eclampsia     PRIOR PREGNANCY        Past Surgical History:  Past Surgical History:   Procedure Laterality Date    MOUTH SURGERY      IMPLANTS     ID  DELIVERY ONLY  2017    Procedure:  SECTION (); Surgeon: Angela Woodward MD;  Location: [facility] ;  Service: Obstetrics    WISDOM TOOTH EXTRACTION         Social History:   She denies current use of alcohol, drugs of abuse, tobacco, and marijuana products       Family History:  Family history was reviewed using an office screening tool, and is negative for congenital anomalies, genetic diseases, and thromboembolism in first degree relatives of this pregnancy  Family history is notable for early onset cancer on both sides of the family, and hypertension on Tigist's side  Medications:    Current Outpatient Medications:     Prenatal MV-Min-Fe Fum-FA-DHA (PRENATAL 1 PO), Take by mouth daily, Disp: , Rfl:     Allergies: Allergies   Allergen Reactions    Pollen Extract        Review of Systems:  Pertinent items are noted in HPI  Exam:  Vitals: Blood pressure 116/73, pulse 81, height 5' 6" (1 676 m), weight 59 6 kg (131 lb 6 3 oz), last menstrual period 10/05/2019  General appearance: alert, well appearing, and in no distress  The remainder of her physical examination was deferred as she was here today for consultation and discussion  Ultrasound findings today are as follows: There is a single live intrauterine pregnancy with size equivalent to dates  No gross anomalies were identified however the anatomic survey is incomplete  Amniotic fluid is within normal limits  Transvaginal ultrasound was performed in conjunction with a transabdominal ultrasound to better visualize the cervix  Cervical length measures 40mm indicating low risk for spontaneous  birth  Placenta appears circumvallate  My recommendations are as follows:     18 weeks gestation of pregnancy  Although encouraging, even a normal-appearing ultrasound cannot exclude all malformations, or the possibility of a genetic syndrome  We reviewed that the anatomic survey is incomplete today, but no malformations were suspected  She will follow-up to complete the anatomic survey during surveillance for cholestasis  She was offered genetic screening and testing options for aneuploidy and declined      Circumvallate placenta  We discussed the finding of a suspected circumvallate placenta (CP), in which the chorionic membrane does not insert at the edge of the placenta, but at some inward distance from the margin  The placenta edge is thickened and rolled up and the edge protrudes into the uterine cavity  This finding occurs in up to 11% of early second trimester ultrasounds, and persists in 1-2% of pregnancies at term  The prognosis is generally good when partial and isolated  Because of a slightly increased risk of  birth, placental abruption, and growth restriction, I do recommend re-evaluation of fetal growth once in the third trimester for this indication  Serial surveillance is already planned in light of cholestasis  Cholestasis during pregnancy  We reviewed the finding of recurrent cholestasis of pregnancy, which is presently asymptomatic  Her bile acids were 11 on 20, which is decreased from 46 at the end of her prior pregnancy in 2017  She is not reporting itching presently and is not on ursodiol  LFTs are normal this pregnancy, but she does have an elevated Tbili of 2 1, of uncertain significance  She has been tested for Hepatitis C and is negative  We discussed that it is atypical to have such early onset of cholestasis, but that her history of cholestasis in a prior pregnancy does put her at increased risk of recurrence in subsequent pregnancies  I advised consultation by gastroenterology and a right upper quadrant ultrasound to evaluate for any underlying hepatobiliary disease, especially in light of elevated bilirubin level  We reviewed that obstetric surveillance for cholestasis entails serial evaluation of fetal growth every 4 weeks, and twice weekly antepartum fetal surveillance starting at 32 weeks gestation  Delivery is recommended between 36 0/7 and 37 0/7 weeks gestation  I do recommend re-evaluating bile acids at 32-34 weeks gestation, because bile acid levels >100 are associated with a particularly high risk of stillbirth and may necessitate delivery before 36 weeks   If delivery is planned prior to 37 0/7 weeks gestation, I would also advise considering  corticosteroids, which we discussed  Hx of preeclampsia, prior pregnancy, currently pregnant  The use of low dose aspirin in pregnancy (81-162mg) is recommended in women with a high risk, or multiple moderate risk factors for preeclampsia  Aspirin therapy should be initiated between 12-28 weeks gestation, and is most effective if started prior to 16 weeks gestation, and continued until delivery  Low dose aspirin in pregnancy has been shown to reduce the incidence of preeclampsia in women with risk factors, and has been shown to be safe and without significant maternal or fetal risk  In light of her risk factors which include prior preeclampsia, I recommend initiating aspirin therapy  I inadvertently did not discuss with her at today's visit, so it should be offered at subsequent visit  History of  delivery affecting pregnancy  Ms Aubrie Ziegler has a history of 1 prior  delivery  Most women with one or two prior low-transverse uterine incisions are candidates for trial of labor after  (TOLAC)  The majority of women attempting a trial of labor after  will succeed, but individual chance of success varies based on individual patient factors, including the circumstances of prior  delivery/deliveries  The most significant, but fortunately rare, risk of TOLAC is uterine rupture, which occurs in 0 5-0 9% of women attempting a TOLAC, and is associated with risks of maternal and fetal morbidity and mortality  Decision regarding mode of delivery (planned repeat  versus TOLAC) should be made after counseling by the patient and her obstetric care provider  Individual chance of success can be estimated using a predictive tool ( calculator), available at [email or web address] html  Her planned mode of delivery at present is repeat            Evaluation and Management:  The patient was counseled regarding the above findings  The limitations of ultrasound were reviewed  The approximate face-to-face time was 30 minutes  The majority of time (>50%) was spent counseling and/or coordinating care with the patient and/or family members  At the conclusion of today's encounter, all questions were answered to her satisfaction  Thank you very much for this kind referral and please do not hesitate to contact me with any further questions or concerns      Sincerely,    Cindi Saunders MD  Attending Physician, [place]

## 2020-02-12 NOTE — ASSESSMENT & PLAN NOTE
The use of low dose aspirin in pregnancy (81-162mg) is recommended in women with a high risk, or multiple moderate risk factors for preeclampsia  Aspirin therapy should be initiated between 12-28 weeks gestation, and is most effective if started prior to 16 weeks gestation, and continued until delivery  Low dose aspirin in pregnancy has been shown to reduce the incidence of preeclampsia in women with risk factors, and has been shown to be safe and without significant maternal or fetal risk  In light of her risk factors which include prior preeclampsia, I recommend initiating aspirin therapy  I inadvertently did not discuss with her at today's visit, so it should be offered at subsequent visit

## 2020-02-12 NOTE — ASSESSMENT & PLAN NOTE
We reviewed the finding of recurrent cholestasis of pregnancy, which is presently asymptomatic  Her bile acids were 11 on 20, which is decreased from 46 at the end of her prior pregnancy in 2017  She is not reporting itching presently and is not on ursodiol  LFTs are normal this pregnancy, but she does have an elevated Tbili of 2 1, of uncertain significance  She has been tested for Hepatitis C and is negative  We discussed that it is atypical to have such early onset of cholestasis, but that her history of cholestasis in a prior pregnancy does put her at increased risk of recurrence in subsequent pregnancies  I advised consultation by gastroenterology and a right upper quadrant ultrasound to evaluate for any underlying hepatobiliary disease, especially in light of elevated bilirubin level  We reviewed that obstetric surveillance for cholestasis entails serial evaluation of fetal growth every 4 weeks, and twice weekly antepartum fetal surveillance starting at 32 weeks gestation  Delivery is recommended between 36 0/7 and 37 0/7 weeks gestation  I do recommend re-evaluating bile acids at 32-34 weeks gestation, because bile acid levels >100 are associated with a particularly high risk of stillbirth and may necessitate delivery before 36 weeks  If delivery is planned prior to 37 0/7 weeks gestation, I would also advise considering  corticosteroids, which we discussed

## 2020-02-28 ENCOUNTER — HOSPITAL ENCOUNTER (OUTPATIENT)
Dept: RADIOLOGY | Facility: HOSPITAL | Age: 25
Discharge: HOME | End: 2020-02-28
Attending: OBSTETRICS & GYNECOLOGY
Payer: COMMERCIAL

## 2020-02-28 ENCOUNTER — ROUTINE PRENATAL (OUTPATIENT)
Dept: OBGYN CLINIC | Facility: CLINIC | Age: 25
End: 2020-02-28

## 2020-02-28 VITALS
WEIGHT: 133.2 LBS | HEIGHT: 66 IN | SYSTOLIC BLOOD PRESSURE: 114 MMHG | BODY MASS INDEX: 21.41 KG/M2 | DIASTOLIC BLOOD PRESSURE: 60 MMHG

## 2020-02-28 DIAGNOSIS — K83.1 CHOLESTASIS DURING PREGNANCY IN SECOND TRIMESTER: Primary | ICD-10-CM

## 2020-02-28 DIAGNOSIS — Z3A.21 21 WEEKS GESTATION OF PREGNANCY: ICD-10-CM

## 2020-02-28 DIAGNOSIS — O26.612 CHOLESTASIS DURING PREGNANCY IN SECOND TRIMESTER: ICD-10-CM

## 2020-02-28 DIAGNOSIS — K83.1 CHOLESTASIS DURING PREGNANCY IN SECOND TRIMESTER: ICD-10-CM

## 2020-02-28 DIAGNOSIS — O26.612 CHOLESTASIS DURING PREGNANCY IN SECOND TRIMESTER: Primary | ICD-10-CM

## 2020-02-28 PROCEDURE — PNV: Performed by: OBSTETRICS & GYNECOLOGY

## 2020-02-28 PROCEDURE — 76705 ECHO EXAM OF ABDOMEN: CPT

## 2020-02-28 NOTE — LETTER
[address] [place] [address]  [address] [facility] [address]      March 6, 2020    MRN: [id number]     Phone: [phone]     Dear Ms  Gely Smith recently had a(n) Ultrasound performed on 2/28/2020 at  [address] [place] [address] that was requested by Rodger Llamas MD  The study was reviewed by a radiologist, which is a physician who specializes in medical imaging  The radiologist issued a report describing his or her findings  In that report there was a finding that the radiologist felt warranted further discussion with your health care provider and that discussion would be beneficial to you  The results were sent to Rodger Llamas MD on 3/4/2020  We recommend that you contact Rodger Llamas MD at [phone] or set up an appointment to discuss the results of the imaging test  If you have already heard from Rodger Llamas MD regarding the results of your study, you can disregard this letter  This letter is not meant to alarm you, but intended to encourage you to follow-up on your results with the provider that sent you for the imaging study  In addition, we have enclosed answers to frequently asked questions by other patients who have also received a letter to review results with their health care provider (see page two)  Thank you for choosing [address] [place] [address] for your medical imaging needs  FREQUENTLY ASKED QUESTIONS    1  Why am I receiving this letter? On license of [facility] requires us to notify patients who have findings on imaging exams that may require more testing or follow-up with a health professional within the next 3 months          2  How serious is the finding on the imaging test?  This letter is sent to all patients who may need follow-up or more testing within the next 3 months  Receiving this letter does not necessarily mean you have a life-threatening imaging finding or disease  Recommendations in the radiologists imaging report are general in nature and it is up to your healthcare provider to say whether those recommendations make sense for your situation  You are strongly encouraged to talk to your health care provider about the results and ask whether additional steps need to be taken  3  Where can I get a copy of the final report for my recent radiology exam?  To get a full copy of the report you can access your records online at [email or web address] or please contact [address] [place] [address] Records Department at [phone] Monday through Friday between 8 am and 6 pm          4  What do I need to do now? Please contact your health care provider who requested the imaging study to discuss what further actions (if any) are needed  You may have already heard from (your ordering provider) in regard to this test in which case you can disregard this letter  NOTICE IN ACCORDANCE WITH THE [facility] PATIENT TEST RESULT INFORMATION ACT OF 2018    You are receiving this notice as a result of a determination by your diagnostic imaging service that further discussions of your test results are warranted and would be beneficial to you  The complete results of your test or tests have been or will be sent to the health care practitioner that ordered the test or tests  It is recommended that you contact your health care practitioner to discuss your results as soon as possible

## 2020-02-28 NOTE — PROGRESS NOTES
Visit:  Good FM - some itching at night  - recent gallbladder US - results pending - recent level II - will recheck bile salts with 28 week labs

## 2020-03-05 ENCOUNTER — DOCUMENTATION (OUTPATIENT)
Dept: PERINATAL CARE | Facility: OTHER | Age: 25
End: 2020-03-05

## 2020-03-05 NOTE — PROGRESS NOTES
Received phone call from [facility] at Radiology regarding significant findings  Ultrasound ordered by Dr Merlinda Downs demonstrates no gallstones  Moderate right hydro which is common pregnancy and would be considered a normal finding  Reportedly normal echogenicity within the liver

## 2020-03-06 NOTE — RESULT ENCOUNTER NOTE
Normal RUQ US reviewed, done for early onset cholestasis  Moderate R hydronephrosis normal for pregnancy  Results message sent to patient     Haris Coleman MD

## 2020-03-10 NOTE — PATIENT INSTRUCTIONS
Thank you for choosing us for your  care today  If you have any questions about your ultrasound or care, please do not hesitate to contact us or your primary obstetrician  Please consider taking aspirin 162mg daily (two of the 81mg tablets) for the prevention of preeclampsia beginning now  If you have asthma and notice an increase in symptom frequency or severity, consider stopping this medication  Reconsider your flu shot! Some general instructions for your pregnancy are:     Exercise: we encourage most pregnant women to get regular physical activity in pregnancy  Exercise has been shown to reduce the risk of several pregnancy-related complications  Unless instructed otherwise, you can aim for 22 minutes per day (150 minutes per week! )   Nutrition: aim for calcium-rich and iron-rich foods as well as healthy sources of protein   Weight: ask your doctor what is the appropriate amount of weight for you to gain in pregnancy  We have nutritionists here if you would like to meet with them   Protect against the flu: get yourself and your entire household vaccinated against influenza  Tell your partner to get vaccinated as well  Good hand hygiene can reduce the spread of this potentially deadly virus  Insist that everyone who is going to hold or be around your baby get vaccinated   Learn about Preeclampsia: preeclampsia is a common, serious complication in pregnancy  A blood pressure of 140mmHg (top number or systolic) OR 22LMCX (bottom number or diastolic) is elevated and needs evaluation by your doctor  Ask your doctor early in pregnancy if you should take aspirin (not motrin or tylenol) to prevent preeclampsia  If you were advised to take aspirin to prevent preeclampsia, a daily dose of 162mg or 81mg is advised  One resource to learn more is www  preeclampsia org    If you smoke, try to reduce how many cigarettes you smoke or quit completely  Do not vape       Other warning signs to watch out for in pregnancy or postpartum: chest pain, obstructed breathing or shortness of breath, seizures, thoughts of hurting yourself or your baby, bleeding, a painful or swollen leg, fever, or headache ([facility] POST-BIRTH Warning Signs campaign)  If these happen call 911  Itching is also not normal in pregnancy and if you experience this, especially over your hands and feet, potentially worse at night, notify your doctors

## 2020-03-11 ENCOUNTER — ULTRASOUND (OUTPATIENT)
Dept: PERINATAL CARE | Facility: OTHER | Age: 25
End: 2020-03-11
Payer: COMMERCIAL

## 2020-03-11 VITALS
DIASTOLIC BLOOD PRESSURE: 68 MMHG | WEIGHT: 138.23 LBS | HEIGHT: 66 IN | SYSTOLIC BLOOD PRESSURE: 105 MMHG | BODY MASS INDEX: 22.22 KG/M2 | HEART RATE: 77 BPM

## 2020-03-11 DIAGNOSIS — Z36.89 ENCOUNTER FOR ULTRASOUND TO CHECK FETAL GROWTH: ICD-10-CM

## 2020-03-11 DIAGNOSIS — IMO0002 EVALUATE ANATOMY NOT SEEN ON PRIOR SONOGRAM: ICD-10-CM

## 2020-03-11 DIAGNOSIS — O26.612 CHOLESTASIS DURING PREGNANCY IN SECOND TRIMESTER: Primary | ICD-10-CM

## 2020-03-11 DIAGNOSIS — K83.1 CHOLESTASIS DURING PREGNANCY IN SECOND TRIMESTER: Primary | ICD-10-CM

## 2020-03-11 PROCEDURE — 99212 OFFICE O/P EST SF 10 MIN: CPT | Performed by: OBSTETRICS & GYNECOLOGY

## 2020-03-11 PROCEDURE — 76816 OB US FOLLOW-UP PER FETUS: CPT | Performed by: OBSTETRICS & GYNECOLOGY

## 2020-03-11 NOTE — PROGRESS NOTES
[address] [place], [place] [address]nicole: Ms Gabo Santoro was seen today at 22w4d for fetal growth and followup missed anatomy ultrasound  See ultrasound report under "OB Procedures" tab  Please don't hesitate to contact our office with any concerns or questions    Luis E Butts MD

## 2020-03-25 ENCOUNTER — ROUTINE PRENATAL (OUTPATIENT)
Dept: OBGYN CLINIC | Facility: CLINIC | Age: 25
End: 2020-03-25

## 2020-03-25 VITALS — DIASTOLIC BLOOD PRESSURE: 70 MMHG | SYSTOLIC BLOOD PRESSURE: 110 MMHG | BODY MASS INDEX: 22.6 KG/M2 | WEIGHT: 140 LBS

## 2020-03-25 DIAGNOSIS — Z34.82 PRENATAL CARE, SUBSEQUENT PREGNANCY, SECOND TRIMESTER: Primary | ICD-10-CM

## 2020-03-25 PROCEDURE — PNV: Performed by: PHYSICIAN ASSISTANT

## 2020-03-25 NOTE — PROGRESS NOTES
Visit: Good FM, Mild cramping, rare  No N/V, HA, VB, LOF, edema, domestic violence, or smoking  Tolerating PNV  Script for 28 wk labwork given including 1 hr GTT and CBC with diff  Continue routine prenatal care  Return to office in 4 weeks for ob check

## 2020-04-02 ENCOUNTER — TELEPHONE (OUTPATIENT)
Dept: PERINATAL CARE | Facility: OTHER | Age: 25
End: 2020-04-02

## 2020-04-08 ENCOUNTER — TELEPHONE (OUTPATIENT)
Dept: PERINATAL CARE | Facility: OTHER | Age: 25
End: 2020-04-08

## 2020-04-29 ENCOUNTER — ROUTINE PRENATAL (OUTPATIENT)
Dept: OBGYN CLINIC | Facility: CLINIC | Age: 25
End: 2020-04-29
Payer: COMMERCIAL

## 2020-04-29 VITALS
SYSTOLIC BLOOD PRESSURE: 126 MMHG | DIASTOLIC BLOOD PRESSURE: 72 MMHG | HEIGHT: 66 IN | WEIGHT: 143 LBS | BODY MASS INDEX: 22.98 KG/M2

## 2020-04-29 DIAGNOSIS — Z23 NEED FOR TDAP VACCINATION: ICD-10-CM

## 2020-04-29 DIAGNOSIS — O26.612 CHOLESTASIS DURING PREGNANCY IN SECOND TRIMESTER: Primary | ICD-10-CM

## 2020-04-29 DIAGNOSIS — K83.1 CHOLESTASIS DURING PREGNANCY IN SECOND TRIMESTER: Primary | ICD-10-CM

## 2020-04-29 DIAGNOSIS — Z34.82 PRENATAL CARE, SUBSEQUENT PREGNANCY, SECOND TRIMESTER: ICD-10-CM

## 2020-04-29 PROCEDURE — 90471 IMMUNIZATION ADMIN: CPT

## 2020-04-29 PROCEDURE — PNV: Performed by: PHYSICIAN ASSISTANT

## 2020-04-29 PROCEDURE — 90715 TDAP VACCINE 7 YRS/> IM: CPT

## 2020-05-07 LAB
EXTERNAL HEMATOCRIT: 33.7 %
EXTERNAL HEMOGLOBIN: 11.6 G/DL
EXTERNAL PLATELET COUNT: 201 K/ΜL

## 2020-05-08 ENCOUNTER — HOSPITAL ENCOUNTER (OUTPATIENT)
Facility: HOSPITAL | Age: 25
Discharge: HOME | End: 2020-05-09
Attending: OBSTETRICS & GYNECOLOGY | Admitting: OBSTETRICS & GYNECOLOGY
Payer: COMMERCIAL

## 2020-05-08 DIAGNOSIS — O34.219 HISTORY OF CESAREAN DELIVERY AFFECTING PREGNANCY: Primary | ICD-10-CM

## 2020-05-09 VITALS
HEART RATE: 73 BPM | DIASTOLIC BLOOD PRESSURE: 63 MMHG | RESPIRATION RATE: 18 BRPM | SYSTOLIC BLOOD PRESSURE: 106 MMHG | TEMPERATURE: 98.7 F

## 2020-05-09 PROCEDURE — 76817 TRANSVAGINAL US OBSTETRIC: CPT | Performed by: OBSTETRICS & GYNECOLOGY

## 2020-05-09 PROCEDURE — G0463 HOSPITAL OUTPT CLINIC VISIT: HCPCS

## 2020-05-09 PROCEDURE — 99213 OFFICE O/P EST LOW 20 MIN: CPT

## 2020-05-12 ENCOUNTER — PATIENT MESSAGE (OUTPATIENT)
Dept: OBGYN CLINIC | Facility: CLINIC | Age: 25
End: 2020-05-12

## 2020-05-12 LAB
ALBUMIN SERPL-MCNC: 3.5 G/DL (ref 3.6–5.1)
ALBUMIN/GLOB SERPL: 1.3 (CALC) (ref 1–2.5)
ALP SERPL-CCNC: 83 U/L (ref 31–125)
ALT SERPL-CCNC: 12 U/L (ref 6–29)
AST SERPL-CCNC: 14 U/L (ref 10–30)
BASOPHILS # BLD AUTO: 33 CELLS/UL (ref 0–200)
BASOPHILS NFR BLD AUTO: 0.4 %
BILE AC SERPL-SCNC: 12 UMOL/L (ref 0–19)
BILIRUB SERPL-MCNC: 1 MG/DL (ref 0.2–1.2)
BUN SERPL-MCNC: 8 MG/DL (ref 7–25)
BUN/CREAT SERPL: ABNORMAL (CALC) (ref 6–22)
CALCIUM SERPL-MCNC: 8.3 MG/DL (ref 8.6–10.2)
CHLORIDE SERPL-SCNC: 102 MMOL/L (ref 98–110)
CO2 SERPL-SCNC: 25 MMOL/L (ref 20–32)
CREAT SERPL-MCNC: 0.51 MG/DL (ref 0.5–1.1)
EOSINOPHIL # BLD AUTO: 141 CELLS/UL (ref 15–500)
EOSINOPHIL NFR BLD AUTO: 1.7 %
ERYTHROCYTE [DISTWIDTH] IN BLOOD BY AUTOMATED COUNT: 12.3 % (ref 11–15)
GLOBULIN SER CALC-MCNC: 2.7 G/DL (CALC) (ref 1.9–3.7)
GLUCOSE 1H P 50 G GLC PO SERPL-MCNC: 114 MG/DL
GLUCOSE SERPL-MCNC: 113 MG/DL (ref 65–99)
HCT VFR BLD AUTO: 33.7 % (ref 35–45)
HGB BLD-MCNC: 11.6 G/DL (ref 11.7–15.5)
LYMPHOCYTES # BLD AUTO: 1204 CELLS/UL (ref 850–3900)
LYMPHOCYTES NFR BLD AUTO: 14.5 %
MCH RBC QN AUTO: 31.2 PG (ref 27–33)
MCHC RBC AUTO-ENTMCNC: 34.4 G/DL (ref 32–36)
MCV RBC AUTO: 90.6 FL (ref 80–100)
MONOCYTES # BLD AUTO: 382 CELLS/UL (ref 200–950)
MONOCYTES NFR BLD AUTO: 4.6 %
NEUTROPHILS # BLD AUTO: 6540 CELLS/UL (ref 1500–7800)
NEUTROPHILS NFR BLD AUTO: 78.8 %
PLATELET # BLD AUTO: 201 THOUSAND/UL (ref 140–400)
PMV BLD REES-ECKER: 10.4 FL (ref 7.5–12.5)
POTASSIUM SERPL-SCNC: 3.5 MMOL/L (ref 3.5–5.3)
PROT SERPL-MCNC: 6.2 G/DL (ref 6.1–8.1)
RBC # BLD AUTO: 3.72 MILLION/UL (ref 3.8–5.1)
RPR SER QL: NORMAL
SL AMB EGFR AFRICAN AMERICAN: 155 ML/MIN/1.73M2
SL AMB EGFR NON AFRICAN AMERICAN: 134 ML/MIN/1.73M2
SODIUM SERPL-SCNC: 134 MMOL/L (ref 135–146)
WBC # BLD AUTO: 8.3 THOUSAND/UL (ref 3.8–10.8)

## 2020-05-14 ENCOUNTER — TELEPHONE (OUTPATIENT)
Dept: PERINATAL CARE | Facility: CLINIC | Age: 25
End: 2020-05-14

## 2020-05-20 ENCOUNTER — TELEPHONE (OUTPATIENT)
Dept: PERINATAL CARE | Facility: CLINIC | Age: 25
End: 2020-05-20

## 2020-05-21 ENCOUNTER — ULTRASOUND (OUTPATIENT)
Dept: PERINATAL CARE | Facility: CLINIC | Age: 25
End: 2020-05-21
Payer: COMMERCIAL

## 2020-05-21 VITALS
HEIGHT: 66 IN | SYSTOLIC BLOOD PRESSURE: 110 MMHG | DIASTOLIC BLOOD PRESSURE: 62 MMHG | WEIGHT: 145 LBS | HEART RATE: 71 BPM | TEMPERATURE: 97.5 F | BODY MASS INDEX: 23.3 KG/M2

## 2020-05-21 DIAGNOSIS — O43.113 CIRCUMVALLATE PLACENTA IN THIRD TRIMESTER: ICD-10-CM

## 2020-05-21 DIAGNOSIS — O26.612 CHOLESTASIS DURING PREGNANCY IN SECOND TRIMESTER: ICD-10-CM

## 2020-05-21 DIAGNOSIS — Z34.82 PRENATAL CARE, SUBSEQUENT PREGNANCY IN SECOND TRIMESTER: ICD-10-CM

## 2020-05-21 DIAGNOSIS — Z36.89 ENCOUNTER FOR ULTRASOUND TO ASSESS FETAL GROWTH: Primary | ICD-10-CM

## 2020-05-21 DIAGNOSIS — K83.1 CHOLESTASIS DURING PREGNANCY IN SECOND TRIMESTER: ICD-10-CM

## 2020-05-21 DIAGNOSIS — Z3A.32 32 WEEKS GESTATION OF PREGNANCY: ICD-10-CM

## 2020-05-21 PROCEDURE — 76816 OB US FOLLOW-UP PER FETUS: CPT | Performed by: OBSTETRICS & GYNECOLOGY

## 2020-05-21 PROCEDURE — 99212 OFFICE O/P EST SF 10 MIN: CPT | Performed by: OBSTETRICS & GYNECOLOGY

## 2020-05-22 ENCOUNTER — TELEPHONE (OUTPATIENT)
Dept: PERINATAL CARE | Facility: CLINIC | Age: 25
End: 2020-05-22

## 2020-05-26 ENCOUNTER — ROUTINE PRENATAL (OUTPATIENT)
Dept: PERINATAL CARE | Facility: CLINIC | Age: 25
End: 2020-05-26
Payer: COMMERCIAL

## 2020-05-26 VITALS
TEMPERATURE: 97.8 F | HEIGHT: 66 IN | WEIGHT: 147.6 LBS | HEART RATE: 85 BPM | BODY MASS INDEX: 23.72 KG/M2 | SYSTOLIC BLOOD PRESSURE: 112 MMHG | DIASTOLIC BLOOD PRESSURE: 67 MMHG

## 2020-05-26 DIAGNOSIS — Z3A.33 33 WEEKS GESTATION OF PREGNANCY: ICD-10-CM

## 2020-05-26 DIAGNOSIS — O26.613 CHOLESTASIS DURING PREGNANCY IN THIRD TRIMESTER: Primary | ICD-10-CM

## 2020-05-26 DIAGNOSIS — K83.1 CHOLESTASIS DURING PREGNANCY IN THIRD TRIMESTER: Primary | ICD-10-CM

## 2020-05-26 PROCEDURE — 59025 FETAL NON-STRESS TEST: CPT | Performed by: OBSTETRICS & GYNECOLOGY

## 2020-05-27 ENCOUNTER — TELEPHONE (OUTPATIENT)
Dept: PERINATAL CARE | Facility: CLINIC | Age: 25
End: 2020-05-27

## 2020-05-28 ENCOUNTER — ULTRASOUND (OUTPATIENT)
Dept: PERINATAL CARE | Facility: CLINIC | Age: 25
End: 2020-05-28
Payer: COMMERCIAL

## 2020-05-28 VITALS
TEMPERATURE: 96.4 F | WEIGHT: 146.4 LBS | HEIGHT: 66 IN | SYSTOLIC BLOOD PRESSURE: 104 MMHG | HEART RATE: 75 BPM | DIASTOLIC BLOOD PRESSURE: 60 MMHG | BODY MASS INDEX: 23.53 KG/M2

## 2020-05-28 DIAGNOSIS — Z3A.33 33 WEEKS GESTATION OF PREGNANCY: Primary | ICD-10-CM

## 2020-05-28 DIAGNOSIS — O26.613 CHOLESTASIS DURING PREGNANCY IN THIRD TRIMESTER: ICD-10-CM

## 2020-05-28 DIAGNOSIS — K83.1 CHOLESTASIS DURING PREGNANCY IN THIRD TRIMESTER: ICD-10-CM

## 2020-05-28 PROCEDURE — 76815 OB US LIMITED FETUS(S): CPT | Performed by: OBSTETRICS & GYNECOLOGY

## 2020-05-28 PROCEDURE — 59025 FETAL NON-STRESS TEST: CPT | Performed by: OBSTETRICS & GYNECOLOGY

## 2020-05-29 ENCOUNTER — TELEPHONE (OUTPATIENT)
Dept: PERINATAL CARE | Facility: CLINIC | Age: 25
End: 2020-05-29

## 2020-06-01 ENCOUNTER — ROUTINE PRENATAL (OUTPATIENT)
Dept: PERINATAL CARE | Facility: CLINIC | Age: 25
End: 2020-06-01
Payer: COMMERCIAL

## 2020-06-01 VITALS — BODY MASS INDEX: 23.63 KG/M2 | TEMPERATURE: 96.6 F | HEIGHT: 66 IN | WEIGHT: 147 LBS

## 2020-06-01 DIAGNOSIS — O26.613 CHOLESTASIS DURING PREGNANCY IN THIRD TRIMESTER: ICD-10-CM

## 2020-06-01 DIAGNOSIS — K83.1 CHOLESTASIS DURING PREGNANCY IN THIRD TRIMESTER: ICD-10-CM

## 2020-06-01 DIAGNOSIS — Z3A.34 34 WEEKS GESTATION OF PREGNANCY: Primary | ICD-10-CM

## 2020-06-01 PROCEDURE — 59025 FETAL NON-STRESS TEST: CPT | Performed by: OBSTETRICS & GYNECOLOGY

## 2020-06-03 ENCOUNTER — TELEPHONE (OUTPATIENT)
Dept: PERINATAL CARE | Facility: CLINIC | Age: 25
End: 2020-06-03

## 2020-06-04 ENCOUNTER — ULTRASOUND (OUTPATIENT)
Dept: PERINATAL CARE | Facility: CLINIC | Age: 25
End: 2020-06-04
Payer: COMMERCIAL

## 2020-06-04 VITALS
HEIGHT: 66 IN | BODY MASS INDEX: 23.63 KG/M2 | WEIGHT: 147 LBS | HEART RATE: 91 BPM | TEMPERATURE: 97.6 F | SYSTOLIC BLOOD PRESSURE: 121 MMHG | DIASTOLIC BLOOD PRESSURE: 65 MMHG

## 2020-06-04 DIAGNOSIS — K83.1 CHOLESTASIS DURING PREGNANCY IN THIRD TRIMESTER: Primary | ICD-10-CM

## 2020-06-04 DIAGNOSIS — O26.613 CHOLESTASIS DURING PREGNANCY IN THIRD TRIMESTER: Primary | ICD-10-CM

## 2020-06-04 DIAGNOSIS — Z3A.34 34 WEEKS GESTATION OF PREGNANCY: ICD-10-CM

## 2020-06-04 PROCEDURE — 76815 OB US LIMITED FETUS(S): CPT | Performed by: OBSTETRICS & GYNECOLOGY

## 2020-06-04 PROCEDURE — 59025 FETAL NON-STRESS TEST: CPT | Performed by: OBSTETRICS & GYNECOLOGY

## 2020-06-05 ENCOUNTER — TELEPHONE (OUTPATIENT)
Dept: PERINATAL CARE | Facility: CLINIC | Age: 25
End: 2020-06-05

## 2020-06-08 ENCOUNTER — ROUTINE PRENATAL (OUTPATIENT)
Dept: PERINATAL CARE | Facility: CLINIC | Age: 25
End: 2020-06-08
Payer: COMMERCIAL

## 2020-06-08 VITALS
HEART RATE: 88 BPM | DIASTOLIC BLOOD PRESSURE: 78 MMHG | TEMPERATURE: 97.4 F | SYSTOLIC BLOOD PRESSURE: 120 MMHG | WEIGHT: 147.2 LBS | HEIGHT: 66 IN | BODY MASS INDEX: 23.66 KG/M2

## 2020-06-08 DIAGNOSIS — Z3A.35 35 WEEKS GESTATION OF PREGNANCY: ICD-10-CM

## 2020-06-08 DIAGNOSIS — O26.613 CHOLESTASIS DURING PREGNANCY IN THIRD TRIMESTER: Primary | ICD-10-CM

## 2020-06-08 DIAGNOSIS — K83.1 CHOLESTASIS DURING PREGNANCY IN THIRD TRIMESTER: Primary | ICD-10-CM

## 2020-06-08 PROCEDURE — 59025 FETAL NON-STRESS TEST: CPT | Performed by: OBSTETRICS & GYNECOLOGY

## 2020-06-10 ENCOUNTER — TELEPHONE (OUTPATIENT)
Dept: PERINATAL CARE | Facility: CLINIC | Age: 25
End: 2020-06-10

## 2020-06-10 RX ORDER — CEFAZOLIN SODIUM 1 G/50ML
1000 SOLUTION INTRAVENOUS ONCE
Status: COMPLETED | OUTPATIENT
Start: 2020-06-10 | End: 2020-06-15

## 2020-06-10 RX ORDER — SODIUM CHLORIDE, SODIUM LACTATE, POTASSIUM CHLORIDE, CALCIUM CHLORIDE 600; 310; 30; 20 MG/100ML; MG/100ML; MG/100ML; MG/100ML
125 INJECTION, SOLUTION INTRAVENOUS CONTINUOUS
Status: DISCONTINUED | OUTPATIENT
Start: 2020-06-10 | End: 2020-06-22

## 2020-06-11 ENCOUNTER — TELEPHONE (OUTPATIENT)
Dept: PERINATAL CARE | Facility: CLINIC | Age: 25
End: 2020-06-11

## 2020-06-11 ENCOUNTER — CLINICAL SUPPORT (OUTPATIENT)
Dept: PERINATAL CARE | Facility: CLINIC | Age: 25
End: 2020-06-11
Payer: COMMERCIAL

## 2020-06-11 ENCOUNTER — ULTRASOUND (OUTPATIENT)
Dept: PERINATAL CARE | Facility: CLINIC | Age: 25
End: 2020-06-11
Payer: COMMERCIAL

## 2020-06-11 VITALS
BODY MASS INDEX: 23.72 KG/M2 | HEIGHT: 66 IN | SYSTOLIC BLOOD PRESSURE: 116 MMHG | HEART RATE: 78 BPM | TEMPERATURE: 97.4 F | DIASTOLIC BLOOD PRESSURE: 66 MMHG | WEIGHT: 147.6 LBS

## 2020-06-11 DIAGNOSIS — O26.613 CHOLESTASIS DURING PREGNANCY IN THIRD TRIMESTER: Primary | ICD-10-CM

## 2020-06-11 DIAGNOSIS — K83.1 CHOLESTASIS DURING PREGNANCY IN THIRD TRIMESTER: Primary | ICD-10-CM

## 2020-06-11 DIAGNOSIS — Z3A.35 35 WEEKS GESTATION OF PREGNANCY: ICD-10-CM

## 2020-06-11 DIAGNOSIS — O09.899 HISTORY OF PRETERM DELIVERY, CURRENTLY PREGNANT: ICD-10-CM

## 2020-06-11 PROCEDURE — 76815 OB US LIMITED FETUS(S): CPT | Performed by: OBSTETRICS & GYNECOLOGY

## 2020-06-11 PROCEDURE — 96372 THER/PROPH/DIAG INJ SC/IM: CPT

## 2020-06-11 PROCEDURE — 59025 FETAL NON-STRESS TEST: CPT | Performed by: OBSTETRICS & GYNECOLOGY

## 2020-06-11 RX ORDER — BETAMETHASONE SODIUM PHOSPHATE AND BETAMETHASONE ACETATE 3; 3 MG/ML; MG/ML
12 INJECTION, SUSPENSION INTRA-ARTICULAR; INTRALESIONAL; INTRAMUSCULAR; SOFT TISSUE EVERY 24 HOURS
Status: COMPLETED | OUTPATIENT
Start: 2020-06-11 | End: 2020-06-12

## 2020-06-11 RX ADMIN — BETAMETHASONE SODIUM PHOSPHATE AND BETAMETHASONE ACETATE 12 MG: 3; 3 INJECTION, SUSPENSION INTRA-ARTICULAR; INTRALESIONAL; INTRAMUSCULAR at 17:32

## 2020-06-12 ENCOUNTER — CLINICAL SUPPORT (OUTPATIENT)
Dept: PERINATAL CARE | Facility: CLINIC | Age: 25
End: 2020-06-12
Payer: COMMERCIAL

## 2020-06-12 DIAGNOSIS — Z3A.35 35 WEEKS GESTATION OF PREGNANCY: ICD-10-CM

## 2020-06-12 PROCEDURE — 96372 THER/PROPH/DIAG INJ SC/IM: CPT

## 2020-06-12 RX ADMIN — BETAMETHASONE SODIUM PHOSPHATE AND BETAMETHASONE ACETATE 12 MG: 3; 3 INJECTION, SUSPENSION INTRA-ARTICULAR; INTRALESIONAL; INTRAMUSCULAR at 13:05

## 2020-06-14 ENCOUNTER — ANESTHESIA EVENT (INPATIENT)
Dept: LABOR AND DELIVERY | Facility: HOSPITAL | Age: 25
End: 2020-06-14
Payer: COMMERCIAL

## 2020-06-15 ENCOUNTER — ANESTHESIA (INPATIENT)
Dept: LABOR AND DELIVERY | Facility: HOSPITAL | Age: 25
End: 2020-06-15
Payer: COMMERCIAL

## 2020-06-15 ENCOUNTER — HOSPITAL ENCOUNTER (INPATIENT)
Facility: HOSPITAL | Age: 25
LOS: 2 days | Discharge: HOME | End: 2020-06-17
Attending: OBSTETRICS & GYNECOLOGY | Admitting: OBSTETRICS & GYNECOLOGY
Payer: COMMERCIAL

## 2020-06-15 DIAGNOSIS — Z3A.36 36 WEEKS GESTATION OF PREGNANCY: ICD-10-CM

## 2020-06-15 DIAGNOSIS — O26.613 CHOLESTASIS DURING PREGNANCY IN THIRD TRIMESTER: ICD-10-CM

## 2020-06-15 DIAGNOSIS — Z98.891 STATUS POST REPEAT LOW TRANSVERSE CESAREAN SECTION: Primary | ICD-10-CM

## 2020-06-15 DIAGNOSIS — O34.219 PREVIOUS CESAREAN SECTION COMPLICATING PREGNANCY: ICD-10-CM

## 2020-06-15 DIAGNOSIS — K83.1 CHOLESTASIS DURING PREGNANCY IN THIRD TRIMESTER: ICD-10-CM

## 2020-06-15 PROBLEM — Z90.79 STATUS POST BILATERAL SALPINGECTOMY: Status: ACTIVE | Noted: 2020-06-15

## 2020-06-15 LAB
ABO GROUP BLD: NORMAL
ALBUMIN SERPL BCP-MCNC: 2.9 G/DL (ref 3.5–5)
ALP SERPL-CCNC: 150 U/L (ref 46–116)
ALT SERPL W P-5'-P-CCNC: 66 U/L (ref 12–78)
ANION GAP SERPL CALCULATED.3IONS-SCNC: 7 MMOL/L (ref 4–13)
AST SERPL W P-5'-P-CCNC: 45 U/L (ref 5–45)
BASE EXCESS BLDCOA CALC-SCNC: 2 MMOL/L (ref 3–11)
BASE EXCESS BLDCOV CALC-SCNC: 2.5 MMOL/L (ref 1–9)
BILIRUB SERPL-MCNC: 0.92 MG/DL (ref 0.2–1)
BLD GP AB SCN SERPL QL: NEGATIVE
BUN SERPL-MCNC: 13 MG/DL (ref 5–25)
CALCIUM SERPL-MCNC: 8.5 MG/DL (ref 8.3–10.1)
CHLORIDE SERPL-SCNC: 100 MMOL/L (ref 100–108)
CO2 SERPL-SCNC: 29 MMOL/L (ref 21–32)
CREAT SERPL-MCNC: 0.8 MG/DL (ref 0.6–1.3)
ERYTHROCYTE [DISTWIDTH] IN BLOOD BY AUTOMATED COUNT: 12.7 % (ref 11.6–15.1)
GFR SERPL CREATININE-BSD FRML MDRD: 103 ML/MIN/1.73SQ M
GLUCOSE SERPL-MCNC: 78 MG/DL (ref 65–140)
HCO3 BLDCOA-SCNC: 31.4 MMOL/L (ref 17.3–27.3)
HCO3 BLDCOV-SCNC: 28.9 MMOL/L (ref 12.2–28.6)
HCT VFR BLD AUTO: 35.2 % (ref 34.8–46.1)
HGB BLD-MCNC: 11.4 G/DL (ref 11.5–15.4)
MCH RBC QN AUTO: 30.8 PG (ref 26.8–34.3)
MCHC RBC AUTO-ENTMCNC: 32.4 G/DL (ref 31.4–37.4)
MCV RBC AUTO: 95 FL (ref 82–98)
O2 CT VFR BLDCOA CALC: 3.9 ML/DL
OXYHGB MFR BLDCOA: 19 %
OXYHGB MFR BLDCOV: 69.3 %
PCO2 BLDCOA: 72.4 MM[HG] (ref 30–60)
PCO2 BLDCOV: 51.3 MM HG (ref 27–43)
PH BLDCOA: 7.25 [PH] (ref 7.23–7.43)
PH BLDCOV: 7.37 [PH] (ref 7.19–7.49)
PLATELET # BLD AUTO: 202 THOUSANDS/UL (ref 149–390)
PMV BLD AUTO: 10.3 FL (ref 8.9–12.7)
PO2 BLDCOA: 12.1 MM HG (ref 5–25)
PO2 BLDCOV: 27.3 MM HG (ref 15–45)
POTASSIUM SERPL-SCNC: 3.5 MMOL/L (ref 3.5–5.3)
PROT SERPL-MCNC: 7.1 G/DL (ref 6.4–8.2)
RBC # BLD AUTO: 3.7 MILLION/UL (ref 3.81–5.12)
RH BLD: POSITIVE
RPR SER QL: NORMAL
SAO2 % BLDCOV: 14.6 ML/DL
SODIUM SERPL-SCNC: 136 MMOL/L (ref 136–145)
SPECIMEN EXPIRATION DATE: NORMAL
WBC # BLD AUTO: 10.41 THOUSAND/UL (ref 4.31–10.16)

## 2020-06-15 PROCEDURE — 99024 POSTOP FOLLOW-UP VISIT: CPT | Performed by: OBSTETRICS & GYNECOLOGY

## 2020-06-15 PROCEDURE — 88302 TISSUE EXAM BY PATHOLOGIST: CPT | Performed by: PATHOLOGY

## 2020-06-15 PROCEDURE — 6A550ZT PHERESIS OF CORD BLOOD STEM CELLS, SINGLE: ICD-10-PCS | Performed by: OBSTETRICS & GYNECOLOGY

## 2020-06-15 PROCEDURE — 86900 BLOOD TYPING SEROLOGIC ABO: CPT | Performed by: OBSTETRICS & GYNECOLOGY

## 2020-06-15 PROCEDURE — 80053 COMPREHEN METABOLIC PANEL: CPT | Performed by: OBSTETRICS & GYNECOLOGY

## 2020-06-15 PROCEDURE — 0UT70ZZ RESECTION OF BILATERAL FALLOPIAN TUBES, OPEN APPROACH: ICD-10-PCS | Performed by: OBSTETRICS & GYNECOLOGY

## 2020-06-15 PROCEDURE — 58611 LIGATE OVIDUCT(S) ADD-ON: CPT | Performed by: OBSTETRICS & GYNECOLOGY

## 2020-06-15 PROCEDURE — 86850 RBC ANTIBODY SCREEN: CPT | Performed by: OBSTETRICS & GYNECOLOGY

## 2020-06-15 PROCEDURE — 86901 BLOOD TYPING SEROLOGIC RH(D): CPT | Performed by: OBSTETRICS & GYNECOLOGY

## 2020-06-15 PROCEDURE — 82805 BLOOD GASES W/O2 SATURATION: CPT | Performed by: OBSTETRICS & GYNECOLOGY

## 2020-06-15 PROCEDURE — 59510 CESAREAN DELIVERY: CPT | Performed by: OBSTETRICS & GYNECOLOGY

## 2020-06-15 PROCEDURE — 4A1HXCZ MONITORING OF PRODUCTS OF CONCEPTION, CARDIAC RATE, EXTERNAL APPROACH: ICD-10-PCS | Performed by: OBSTETRICS & GYNECOLOGY

## 2020-06-15 PROCEDURE — 85027 COMPLETE CBC AUTOMATED: CPT | Performed by: OBSTETRICS & GYNECOLOGY

## 2020-06-15 PROCEDURE — 86592 SYPHILIS TEST NON-TREP QUAL: CPT | Performed by: OBSTETRICS & GYNECOLOGY

## 2020-06-15 RX ORDER — MORPHINE SULFATE 0.5 MG/ML
INJECTION, SOLUTION EPIDURAL; INTRATHECAL; INTRAVENOUS AS NEEDED
Status: DISCONTINUED | OUTPATIENT
Start: 2020-06-15 | End: 2020-06-15 | Stop reason: SURG

## 2020-06-15 RX ORDER — IBUPROFEN 600 MG/1
600 TABLET ORAL EVERY 6 HOURS SCHEDULED
Status: DISCONTINUED | OUTPATIENT
Start: 2020-06-16 | End: 2020-06-17 | Stop reason: HOSPADM

## 2020-06-15 RX ORDER — HYDROMORPHONE HCL/PF 1 MG/ML
0.5 SYRINGE (ML) INJECTION
Status: DISCONTINUED | OUTPATIENT
Start: 2020-06-15 | End: 2020-06-17 | Stop reason: HOSPADM

## 2020-06-15 RX ORDER — DEXAMETHASONE SODIUM PHOSPHATE 4 MG/ML
8 INJECTION, SOLUTION INTRA-ARTICULAR; INTRALESIONAL; INTRAMUSCULAR; INTRAVENOUS; SOFT TISSUE ONCE AS NEEDED
Status: ACTIVE | OUTPATIENT
Start: 2020-06-15 | End: 2020-06-16

## 2020-06-15 RX ORDER — DIAPER,BRIEF,INFANT-TODD,DISP
1 EACH MISCELLANEOUS DAILY PRN
Status: DISCONTINUED | OUTPATIENT
Start: 2020-06-15 | End: 2020-06-17 | Stop reason: HOSPADM

## 2020-06-15 RX ORDER — DIPHENHYDRAMINE HYDROCHLORIDE 50 MG/ML
12.5 INJECTION INTRAMUSCULAR; INTRAVENOUS ONCE AS NEEDED
Status: DISCONTINUED | OUTPATIENT
Start: 2020-06-15 | End: 2020-06-17 | Stop reason: HOSPADM

## 2020-06-15 RX ORDER — FENTANYL CITRATE/PF 50 MCG/ML
50 SYRINGE (ML) INJECTION
Status: DISCONTINUED | OUTPATIENT
Start: 2020-06-15 | End: 2020-06-17 | Stop reason: HOSPADM

## 2020-06-15 RX ORDER — OXYTOCIN/RINGER'S LACTATE 30/500 ML
PLASTIC BAG, INJECTION (ML) INTRAVENOUS CONTINUOUS PRN
Status: DISCONTINUED | OUTPATIENT
Start: 2020-06-15 | End: 2020-06-15 | Stop reason: SURG

## 2020-06-15 RX ORDER — SODIUM CHLORIDE, SODIUM LACTATE, POTASSIUM CHLORIDE, CALCIUM CHLORIDE 600; 310; 30; 20 MG/100ML; MG/100ML; MG/100ML; MG/100ML
125 INJECTION, SOLUTION INTRAVENOUS CONTINUOUS
Status: DISCONTINUED | OUTPATIENT
Start: 2020-06-15 | End: 2020-06-17 | Stop reason: HOSPADM

## 2020-06-15 RX ORDER — OXYTOCIN/RINGER'S LACTATE 30/500 ML
62.5 PLASTIC BAG, INJECTION (ML) INTRAVENOUS
Status: DISPENSED | OUTPATIENT
Start: 2020-06-15 | End: 2020-06-15

## 2020-06-15 RX ORDER — KETOROLAC TROMETHAMINE 30 MG/ML
INJECTION, SOLUTION INTRAMUSCULAR; INTRAVENOUS AS NEEDED
Status: DISCONTINUED | OUTPATIENT
Start: 2020-06-15 | End: 2020-06-15 | Stop reason: SURG

## 2020-06-15 RX ORDER — OXYCODONE HYDROCHLORIDE 5 MG/1
5 TABLET ORAL EVERY 4 HOURS PRN
Status: DISCONTINUED | OUTPATIENT
Start: 2020-06-16 | End: 2020-06-15

## 2020-06-15 RX ORDER — NALOXONE HYDROCHLORIDE 0.4 MG/ML
0.1 INJECTION, SOLUTION INTRAMUSCULAR; INTRAVENOUS; SUBCUTANEOUS
Status: ACTIVE | OUTPATIENT
Start: 2020-06-15 | End: 2020-06-16

## 2020-06-15 RX ORDER — METOCLOPRAMIDE HYDROCHLORIDE 5 MG/ML
INJECTION INTRAMUSCULAR; INTRAVENOUS AS NEEDED
Status: DISCONTINUED | OUTPATIENT
Start: 2020-06-15 | End: 2020-06-15 | Stop reason: SURG

## 2020-06-15 RX ORDER — CEFAZOLIN SODIUM 1 G/50ML
1000 SOLUTION INTRAVENOUS ONCE
Status: DISCONTINUED | OUTPATIENT
Start: 2020-06-15 | End: 2020-06-15

## 2020-06-15 RX ORDER — ONDANSETRON 2 MG/ML
INJECTION INTRAMUSCULAR; INTRAVENOUS AS NEEDED
Status: DISCONTINUED | OUTPATIENT
Start: 2020-06-15 | End: 2020-06-15 | Stop reason: SURG

## 2020-06-15 RX ORDER — OXYTOCIN/RINGER'S LACTATE 30/500 ML
62.5 PLASTIC BAG, INJECTION (ML) INTRAVENOUS
Status: ACTIVE | OUTPATIENT
Start: 2020-06-15 | End: 2020-06-15

## 2020-06-15 RX ORDER — KETOROLAC TROMETHAMINE 30 MG/ML
30 INJECTION, SOLUTION INTRAMUSCULAR; INTRAVENOUS EVERY 6 HOURS
Status: DISPENSED | OUTPATIENT
Start: 2020-06-15 | End: 2020-06-16

## 2020-06-15 RX ORDER — SIMETHICONE 80 MG
80 TABLET,CHEWABLE ORAL 4 TIMES DAILY PRN
Status: DISCONTINUED | OUTPATIENT
Start: 2020-06-15 | End: 2020-06-17 | Stop reason: HOSPADM

## 2020-06-15 RX ORDER — HYDROMORPHONE HCL/PF 1 MG/ML
0.2 SYRINGE (ML) INJECTION
Status: DISCONTINUED | OUTPATIENT
Start: 2020-06-15 | End: 2020-06-17 | Stop reason: HOSPADM

## 2020-06-15 RX ORDER — ONDANSETRON 2 MG/ML
4 INJECTION INTRAMUSCULAR; INTRAVENOUS EVERY 4 HOURS PRN
Status: ACTIVE | OUTPATIENT
Start: 2020-06-15 | End: 2020-06-16

## 2020-06-15 RX ORDER — METOCLOPRAMIDE HYDROCHLORIDE 5 MG/ML
10 INJECTION INTRAMUSCULAR; INTRAVENOUS ONCE AS NEEDED
Status: DISCONTINUED | OUTPATIENT
Start: 2020-06-15 | End: 2020-06-17 | Stop reason: HOSPADM

## 2020-06-15 RX ORDER — ACETAMINOPHEN 325 MG/1
650 TABLET ORAL EVERY 6 HOURS SCHEDULED
Status: DISCONTINUED | OUTPATIENT
Start: 2020-06-15 | End: 2020-06-17 | Stop reason: HOSPADM

## 2020-06-15 RX ORDER — OXYTOCIN/RINGER'S LACTATE 30/500 ML
62.5 PLASTIC BAG, INJECTION (ML) INTRAVENOUS
Status: DISPENSED | OUTPATIENT
Start: 2020-06-15 | End: 2020-06-16

## 2020-06-15 RX ORDER — OXYCODONE HYDROCHLORIDE 5 MG/1
10 TABLET ORAL EVERY 4 HOURS PRN
Status: DISCONTINUED | OUTPATIENT
Start: 2020-06-15 | End: 2020-06-17 | Stop reason: HOSPADM

## 2020-06-15 RX ORDER — ONDANSETRON 2 MG/ML
4 INJECTION INTRAMUSCULAR; INTRAVENOUS EVERY 8 HOURS PRN
Status: DISCONTINUED | OUTPATIENT
Start: 2020-06-15 | End: 2020-06-15

## 2020-06-15 RX ORDER — SODIUM CHLORIDE, SODIUM LACTATE, POTASSIUM CHLORIDE, CALCIUM CHLORIDE 600; 310; 30; 20 MG/100ML; MG/100ML; MG/100ML; MG/100ML
75 INJECTION, SOLUTION INTRAVENOUS CONTINUOUS
Status: DISCONTINUED | OUTPATIENT
Start: 2020-06-15 | End: 2020-06-17 | Stop reason: HOSPADM

## 2020-06-15 RX ORDER — METOCLOPRAMIDE HYDROCHLORIDE 5 MG/ML
5 INJECTION INTRAMUSCULAR; INTRAVENOUS EVERY 6 HOURS PRN
Status: ACTIVE | OUTPATIENT
Start: 2020-06-15 | End: 2020-06-16

## 2020-06-15 RX ORDER — FENTANYL CITRATE 50 UG/ML
INJECTION, SOLUTION INTRAMUSCULAR; INTRAVENOUS AS NEEDED
Status: DISCONTINUED | OUTPATIENT
Start: 2020-06-15 | End: 2020-06-15 | Stop reason: SURG

## 2020-06-15 RX ORDER — CALCIUM CARBONATE 200(500)MG
1000 TABLET,CHEWABLE ORAL DAILY PRN
Status: DISCONTINUED | OUTPATIENT
Start: 2020-06-15 | End: 2020-06-17 | Stop reason: HOSPADM

## 2020-06-15 RX ORDER — ONDANSETRON 2 MG/ML
4 INJECTION INTRAMUSCULAR; INTRAVENOUS ONCE AS NEEDED
Status: DISCONTINUED | OUTPATIENT
Start: 2020-06-15 | End: 2020-06-17 | Stop reason: HOSPADM

## 2020-06-15 RX ORDER — OXYCODONE HYDROCHLORIDE 5 MG/1
10 TABLET ORAL EVERY 4 HOURS PRN
Status: DISCONTINUED | OUTPATIENT
Start: 2020-06-16 | End: 2020-06-15

## 2020-06-15 RX ORDER — OXYCODONE HYDROCHLORIDE 5 MG/1
5 TABLET ORAL EVERY 4 HOURS PRN
Status: DISCONTINUED | OUTPATIENT
Start: 2020-06-15 | End: 2020-06-17 | Stop reason: HOSPADM

## 2020-06-15 RX ORDER — DOCUSATE SODIUM 100 MG/1
100 CAPSULE, LIQUID FILLED ORAL 2 TIMES DAILY
Status: DISCONTINUED | OUTPATIENT
Start: 2020-06-15 | End: 2020-06-17 | Stop reason: HOSPADM

## 2020-06-15 RX ADMIN — Medication 62.5 MILLI-UNITS/MIN: at 16:50

## 2020-06-15 RX ADMIN — KETOROLAC TROMETHAMINE 30 MG: 30 INJECTION, SOLUTION INTRAMUSCULAR at 17:14

## 2020-06-15 RX ADMIN — SODIUM CHLORIDE, SODIUM LACTATE, POTASSIUM CHLORIDE, AND CALCIUM CHLORIDE: .6; .31; .03; .02 INJECTION, SOLUTION INTRAVENOUS at 10:04

## 2020-06-15 RX ADMIN — ACETAMINOPHEN 650 MG: 325 TABLET, FILM COATED ORAL at 14:08

## 2020-06-15 RX ADMIN — SODIUM CHLORIDE, SODIUM LACTATE, POTASSIUM CHLORIDE, AND CALCIUM CHLORIDE 1000 ML: .6; .31; .03; .02 INJECTION, SOLUTION INTRAVENOUS at 09:13

## 2020-06-15 RX ADMIN — METOCLOPRAMIDE HYDROCHLORIDE 10 MG: 5 INJECTION INTRAMUSCULAR; INTRAVENOUS at 11:13

## 2020-06-15 RX ADMIN — PHENYLEPHRINE HYDROCHLORIDE 80 MCG/MIN: 10 INJECTION INTRAVENOUS at 10:12

## 2020-06-15 RX ADMIN — SODIUM CHLORIDE, SODIUM LACTATE, POTASSIUM CHLORIDE, AND CALCIUM CHLORIDE 125 ML/HR: .6; .31; .03; .02 INJECTION, SOLUTION INTRAVENOUS at 09:43

## 2020-06-15 RX ADMIN — OXYCODONE HYDROCHLORIDE 10 MG: 5 TABLET ORAL at 15:11

## 2020-06-15 RX ADMIN — ACETAMINOPHEN 650 MG: 325 TABLET, FILM COATED ORAL at 20:00

## 2020-06-15 RX ADMIN — Medication 62.5 MILLI-UNITS/MIN: at 12:07

## 2020-06-15 RX ADMIN — FENTANYL CITRATE 15 MCG: 50 INJECTION INTRAMUSCULAR; INTRAVENOUS at 10:11

## 2020-06-15 RX ADMIN — KETOROLAC TROMETHAMINE 30 MG: 30 INJECTION, SOLUTION INTRAMUSCULAR at 11:14

## 2020-06-15 RX ADMIN — CEFAZOLIN SODIUM 1000 MG: 1 SOLUTION INTRAVENOUS at 10:14

## 2020-06-15 RX ADMIN — MORPHINE SULFATE 0.1 MG: 0.5 INJECTION, SOLUTION EPIDURAL; INTRATHECAL; INTRAVENOUS at 10:11

## 2020-06-15 RX ADMIN — Medication 250 MILLI-UNITS/MIN: at 10:37

## 2020-06-15 RX ADMIN — DOCUSATE SODIUM 100 MG: 100 CAPSULE, LIQUID FILLED ORAL at 17:15

## 2020-06-15 RX ADMIN — SODIUM CHLORIDE, SODIUM LACTATE, POTASSIUM CHLORIDE, AND CALCIUM CHLORIDE 125 ML/HR: .6; .31; .03; .02 INJECTION, SOLUTION INTRAVENOUS at 18:20

## 2020-06-15 RX ADMIN — ONDANSETRON 4 MG: 2 INJECTION INTRAMUSCULAR; INTRAVENOUS at 11:07

## 2020-06-15 RX ADMIN — KETOROLAC TROMETHAMINE 30 MG: 30 INJECTION, SOLUTION INTRAMUSCULAR at 23:46

## 2020-06-16 LAB
ERYTHROCYTE [DISTWIDTH] IN BLOOD BY AUTOMATED COUNT: 12.8 % (ref 11.6–15.1)
HCT VFR BLD AUTO: 28.7 % (ref 34.8–46.1)
HGB BLD-MCNC: 9.5 G/DL (ref 11.5–15.4)
MCH RBC QN AUTO: 30.9 PG (ref 26.8–34.3)
MCHC RBC AUTO-ENTMCNC: 33.1 G/DL (ref 31.4–37.4)
MCV RBC AUTO: 94 FL (ref 82–98)
PLATELET # BLD AUTO: 143 THOUSANDS/UL (ref 149–390)
PMV BLD AUTO: 10.2 FL (ref 8.9–12.7)
RBC # BLD AUTO: 3.07 MILLION/UL (ref 3.81–5.12)
WBC # BLD AUTO: 9.41 THOUSAND/UL (ref 4.31–10.16)

## 2020-06-16 PROCEDURE — 85027 COMPLETE CBC AUTOMATED: CPT | Performed by: OBSTETRICS & GYNECOLOGY

## 2020-06-16 PROCEDURE — 99024 POSTOP FOLLOW-UP VISIT: CPT | Performed by: OBSTETRICS & GYNECOLOGY

## 2020-06-16 RX ADMIN — IBUPROFEN 600 MG: 600 TABLET ORAL at 23:51

## 2020-06-16 RX ADMIN — OXYCODONE HYDROCHLORIDE 5 MG: 5 TABLET ORAL at 10:50

## 2020-06-16 RX ADMIN — ACETAMINOPHEN 650 MG: 325 TABLET, FILM COATED ORAL at 08:04

## 2020-06-16 RX ADMIN — ACETAMINOPHEN 650 MG: 325 TABLET, FILM COATED ORAL at 02:09

## 2020-06-16 RX ADMIN — ACETAMINOPHEN 650 MG: 325 TABLET, FILM COATED ORAL at 23:51

## 2020-06-16 RX ADMIN — IBUPROFEN 600 MG: 600 TABLET ORAL at 18:26

## 2020-06-16 RX ADMIN — Medication 62.5 MILLI-UNITS/MIN: at 00:50

## 2020-06-16 RX ADMIN — ACETAMINOPHEN 650 MG: 325 TABLET, FILM COATED ORAL at 12:10

## 2020-06-16 RX ADMIN — IBUPROFEN 600 MG: 600 TABLET ORAL at 12:10

## 2020-06-16 RX ADMIN — SIMETHICONE CHEW TAB 80 MG 80 MG: 80 TABLET ORAL at 10:50

## 2020-06-16 RX ADMIN — DOCUSATE SODIUM 100 MG: 100 CAPSULE, LIQUID FILLED ORAL at 09:17

## 2020-06-16 RX ADMIN — KETOROLAC TROMETHAMINE 30 MG: 30 INJECTION, SOLUTION INTRAMUSCULAR at 05:32

## 2020-06-16 RX ADMIN — SODIUM CHLORIDE, SODIUM LACTATE, POTASSIUM CHLORIDE, AND CALCIUM CHLORIDE 125 ML/HR: .6; .31; .03; .02 INJECTION, SOLUTION INTRAVENOUS at 02:25

## 2020-06-16 RX ADMIN — OXYCODONE HYDROCHLORIDE 5 MG: 5 TABLET ORAL at 21:12

## 2020-06-16 RX ADMIN — DOCUSATE SODIUM 100 MG: 100 CAPSULE, LIQUID FILLED ORAL at 18:26

## 2020-06-16 RX ADMIN — ACETAMINOPHEN 650 MG: 325 TABLET, FILM COATED ORAL at 18:26

## 2020-06-16 RX ADMIN — Medication 1 TABLET: at 09:17

## 2020-06-17 VITALS
RESPIRATION RATE: 18 BRPM | TEMPERATURE: 98.1 F | DIASTOLIC BLOOD PRESSURE: 66 MMHG | BODY MASS INDEX: 23.63 KG/M2 | WEIGHT: 147 LBS | SYSTOLIC BLOOD PRESSURE: 112 MMHG | HEART RATE: 60 BPM | OXYGEN SATURATION: 97 % | HEIGHT: 66 IN

## 2020-06-17 PROBLEM — Z34.82 PRENATAL CARE, SUBSEQUENT PREGNANCY, SECOND TRIMESTER: Status: RESOLVED | Noted: 2020-03-25 | Resolved: 2020-06-17

## 2020-06-17 PROBLEM — Z34.82 PRENATAL CARE, SUBSEQUENT PREGNANCY IN SECOND TRIMESTER: Status: RESOLVED | Noted: 2019-12-31 | Resolved: 2020-06-17

## 2020-06-17 PROBLEM — O09.899 HISTORY OF PRETERM DELIVERY, CURRENTLY PREGNANT: Status: RESOLVED | Noted: 2020-02-12 | Resolved: 2020-06-17

## 2020-06-17 PROBLEM — Z3A.35 35 WEEKS GESTATION OF PREGNANCY: Status: RESOLVED | Noted: 2020-01-31 | Resolved: 2020-06-17

## 2020-06-17 PROBLEM — O09.299 HX OF PREECLAMPSIA, PRIOR PREGNANCY, CURRENTLY PREGNANT: Status: RESOLVED | Noted: 2020-02-12 | Resolved: 2020-06-17

## 2020-06-17 PROBLEM — O43.119 CIRCUMVALLATE PLACENTA: Status: RESOLVED | Noted: 2020-02-12 | Resolved: 2020-06-17

## 2020-06-17 PROBLEM — Z34.81 MULTIGRAVIDA IN FIRST TRIMESTER: Status: RESOLVED | Noted: 2019-12-04 | Resolved: 2020-06-17

## 2020-06-17 PROCEDURE — 99024 POSTOP FOLLOW-UP VISIT: CPT | Performed by: OBSTETRICS & GYNECOLOGY

## 2020-06-17 RX ORDER — IBUPROFEN 600 MG/1
600 TABLET ORAL EVERY 6 HOURS PRN
Qty: 30 TABLET | Refills: 0 | Status: SHIPPED | OUTPATIENT
Start: 2020-06-17 | End: 2020-07-16 | Stop reason: ALTCHOICE

## 2020-06-17 RX ORDER — OXYCODONE HYDROCHLORIDE 5 MG/1
5 TABLET ORAL EVERY 6 HOURS PRN
Qty: 10 TABLET | Refills: 0 | Status: SHIPPED | OUTPATIENT
Start: 2020-06-17 | End: 2020-06-22

## 2020-06-17 RX ORDER — ACETAMINOPHEN 325 MG/1
650 TABLET ORAL EVERY 6 HOURS SCHEDULED
Qty: 30 TABLET | Refills: 0 | Status: CANCELLED | OUTPATIENT
Start: 2020-06-17

## 2020-06-17 RX ORDER — DOCUSATE SODIUM 100 MG/1
100 CAPSULE, LIQUID FILLED ORAL 2 TIMES DAILY
Qty: 10 CAPSULE | Refills: 0 | Status: CANCELLED | OUTPATIENT
Start: 2020-06-17

## 2020-06-17 RX ADMIN — OXYCODONE HYDROCHLORIDE 5 MG: 5 TABLET ORAL at 05:00

## 2020-06-17 RX ADMIN — IBUPROFEN 600 MG: 600 TABLET ORAL at 06:04

## 2020-06-17 RX ADMIN — DOCUSATE SODIUM 100 MG: 100 CAPSULE, LIQUID FILLED ORAL at 11:30

## 2020-06-17 RX ADMIN — OXYCODONE HYDROCHLORIDE 5 MG: 5 TABLET ORAL at 11:29

## 2020-06-17 RX ADMIN — ACETAMINOPHEN 650 MG: 325 TABLET, FILM COATED ORAL at 06:04

## 2020-06-22 ENCOUNTER — POSTPARTUM VISIT (OUTPATIENT)
Dept: OBGYN CLINIC | Facility: CLINIC | Age: 25
End: 2020-06-22

## 2020-06-22 VITALS
TEMPERATURE: 96.8 F | HEIGHT: 66 IN | BODY MASS INDEX: 21.02 KG/M2 | SYSTOLIC BLOOD PRESSURE: 116 MMHG | DIASTOLIC BLOOD PRESSURE: 80 MMHG | WEIGHT: 130.8 LBS

## 2020-06-22 DIAGNOSIS — Z98.891 STATUS POST REPEAT LOW TRANSVERSE CESAREAN SECTION: Primary | ICD-10-CM

## 2020-06-22 LAB — PLACENTA IN STORAGE: NORMAL

## 2020-06-22 PROCEDURE — 99024 POSTOP FOLLOW-UP VISIT: CPT | Performed by: PHYSICIAN ASSISTANT

## 2020-07-16 ENCOUNTER — POSTPARTUM VISIT (OUTPATIENT)
Dept: OBGYN CLINIC | Facility: CLINIC | Age: 25
End: 2020-07-16

## 2020-07-16 VITALS
DIASTOLIC BLOOD PRESSURE: 76 MMHG | WEIGHT: 132 LBS | TEMPERATURE: 97 F | SYSTOLIC BLOOD PRESSURE: 106 MMHG | BODY MASS INDEX: 21.21 KG/M2 | HEIGHT: 66 IN

## 2020-07-16 DIAGNOSIS — Z90.79 STATUS POST BILATERAL SALPINGECTOMY: ICD-10-CM

## 2020-07-16 DIAGNOSIS — Z98.891 STATUS POST REPEAT LOW TRANSVERSE CESAREAN SECTION: ICD-10-CM

## 2020-07-16 PROBLEM — O34.219 HISTORY OF CESAREAN DELIVERY AFFECTING PREGNANCY: Status: RESOLVED | Noted: 2017-12-05 | Resolved: 2020-07-16

## 2020-07-16 PROBLEM — K83.1 CHOLESTASIS DURING PREGNANCY: Status: RESOLVED | Noted: 2019-12-04 | Resolved: 2020-07-16

## 2020-07-16 PROBLEM — O26.619 CHOLESTASIS DURING PREGNANCY: Status: RESOLVED | Noted: 2019-12-04 | Resolved: 2020-07-16

## 2020-07-16 PROCEDURE — 99024 POSTOP FOLLOW-UP VISIT: CPT | Performed by: OBSTETRICS & GYNECOLOGY

## 2020-07-16 NOTE — PROGRESS NOTES
Assessment/Plan:    Normal postoperative/postpartum check, status post repeat  section with bilateral salpingectomy  Good recovery to date   Released for normal activity   Return to office in 3-4 months for annual exam earlier as needed       Problem List Items Addressed This Visit        Other    Status post repeat low transverse  section    Status post bilateral salpingectomy    Postpartum care following  delivery - Primary            Subjective:      Patient ID: Annamarie Staton is a 21 y o  female  Abdirahman Packer is here today for a postpartum/postoperative visit  She underwent a repeat  section with bilateral salpingectomy Violeta 15, 2020  She has had good recovery to date  She is breast and bottle feeding  Bowels and bladder returned to normal she has no lochia at this time  We did review the pathology from the salpingectomy  The following portions of the patient's history were reviewed and updated as appropriate: allergies, current medications, past family history, past medical history, past social history, past surgical history and problem list     Review of Systems   Constitutional: Negative for chills, fatigue, fever and unexpected weight change  HENT: Negative for dental problem, sinus pressure and sinus pain  Eyes: Negative for visual disturbance  Respiratory: Negative for cough, shortness of breath and wheezing  Cardiovascular: Negative for chest pain and leg swelling  Gastrointestinal: Negative for constipation, diarrhea, nausea and vomiting  Genitourinary: Negative for urgency  Musculoskeletal: Negative for back pain and joint swelling  Allergic/Immunologic: Negative for environmental allergies  Neurological: Negative for dizziness and headaches  Psychiatric/Behavioral: The patient is not nervous/anxious            Objective:      LMP 10/05/2019 (Exact Date)     /76 (BP Location: Left arm, Patient Position: Sitting, Cuff Size: Standard) Temp (!) 97 °F (36 1 °C)   Ht 5' 6" (1 676 m)   Wt 59 9 kg (132 lb)   LMP 10/05/2019 (Exact Date)   Breastfeeding Yes Comment: Breast and bottle feeding   BMI 21 31 kg/m²        Physical Exam  Vitals signs reviewed  Constitutional:       General: She is not in acute distress  Appearance: Normal appearance  She is well-developed and normal weight  She is not ill-appearing  Comments: Young white female   Abdominal:      General: There is no distension  Palpations: Abdomen is soft  There is no mass  Tenderness: There is no abdominal tenderness  There is no guarding or rebound  Comments: Pfannenstiel incision healing well   Genitourinary:     Comments: Perineum intact  Cervix closed  Uterus well involuted, mobile and nontender  Neurological:      General: No focal deficit present  Mental Status: She is alert and oriented to person, place, and time  Psychiatric:         Behavior: Behavior normal          Thought Content:  Thought content normal          Judgment: Judgment normal

## 2021-06-04 NOTE — PROGRESS NOTES
ONGOING DISCHARGE PLAN:    Patient is alert and oriented x4. Spoke with patient regarding discharge plan and patient confirms that plan is still to discharge to home with vns  IV lasix 20 mg   Increase tube feed   POD 7      Will continue to follow for additional discharge needs.     Electronically signed by Deniz Orourke RN on 6/4/2021 at 2:23 PM Pt had a 4 minute contraction @ 1257, denies feeling anything other than a slight backache  This was preceded by some uterine irritabilty starting at 1156

## (undated) DEVICE — SURGICEL 2 X 3

## (undated) DEVICE — CHLORAPREP HI-LITE 10.5ML ORANGE

## (undated) DEVICE — PACK C-SECTION PBDS

## (undated) DEVICE — GLOVE SRG BIOGEL ECLIPSE 6.5

## (undated) DEVICE — DRESSING TELFA 2 X 3 IN STRL

## (undated) DEVICE — SUT VICRYL 0 CT-1 27 IN J260H

## (undated) DEVICE — SUT PLAIN 2-0 CTX 27 IN 872H

## (undated) DEVICE — Device

## (undated) DEVICE — GLOVE INDICATOR PI UNDERGLOVE SZ 6.5 BLUE

## (undated) DEVICE — SUT MONOCRYL 0 36 IN UNDYED Y946H

## (undated) DEVICE — GAUZE SPONGES,16 PLY: Brand: CURITY

## (undated) DEVICE — SKIN MARKER DUAL TIP WITH RULER CAP, FLEXIBLE RULER AND LABELS: Brand: DEVON

## (undated) DEVICE — CHLORAPREP HI-LITE 26ML ORANGE

## (undated) DEVICE — SUT VICRYL 0 CT-1 36 IN J946H

## (undated) DEVICE — ABDOMINAL PAD: Brand: DERMACEA

## (undated) DEVICE — TELFA NON-ADHERENT ABSORBENT DRESSING: Brand: TELFA

## (undated) DEVICE — SUT MONOCRYL 0 CTX 36 IN Y398H